# Patient Record
Sex: MALE | Race: WHITE | URBAN - METROPOLITAN AREA
[De-identification: names, ages, dates, MRNs, and addresses within clinical notes are randomized per-mention and may not be internally consistent; named-entity substitution may affect disease eponyms.]

---

## 2017-09-26 ENCOUNTER — OFFICE VISIT (OUTPATIENT)
Dept: FAMILY MEDICINE CLINIC | Age: 32
End: 2017-09-26

## 2017-09-26 VITALS
OXYGEN SATURATION: 98 % | SYSTOLIC BLOOD PRESSURE: 150 MMHG | HEART RATE: 90 BPM | WEIGHT: 277 LBS | DIASTOLIC BLOOD PRESSURE: 98 MMHG | BODY MASS INDEX: 35.55 KG/M2 | HEIGHT: 74 IN

## 2017-09-26 DIAGNOSIS — Z23 NEED FOR TDAP VACCINATION: ICD-10-CM

## 2017-09-26 DIAGNOSIS — K21.9 GASTROESOPHAGEAL REFLUX DISEASE, ESOPHAGITIS PRESENCE NOT SPECIFIED: ICD-10-CM

## 2017-09-26 DIAGNOSIS — I10 ESSENTIAL HYPERTENSION: ICD-10-CM

## 2017-09-26 DIAGNOSIS — K76.0 HEPATIC STEATOSIS: ICD-10-CM

## 2017-09-26 DIAGNOSIS — Z00.00 ANNUAL PHYSICAL EXAM: ICD-10-CM

## 2017-09-26 DIAGNOSIS — E66.9 OBESITY, UNSPECIFIED OBESITY SEVERITY, UNSPECIFIED OBESITY TYPE: ICD-10-CM

## 2017-09-26 LAB
A/G RATIO: 2.3 (ref 1.1–2.2)
ALBUMIN SERPL-MCNC: 4.8 G/DL (ref 3.4–5)
ALP BLD-CCNC: 46 U/L (ref 40–129)
ALT SERPL-CCNC: 49 U/L (ref 10–40)
ANION GAP SERPL CALCULATED.3IONS-SCNC: 13 MMOL/L (ref 3–16)
AST SERPL-CCNC: 33 U/L (ref 15–37)
BASOPHILS ABSOLUTE: 0.1 K/UL (ref 0–0.2)
BASOPHILS RELATIVE PERCENT: 1.5 %
BILIRUB SERPL-MCNC: 0.7 MG/DL (ref 0–1)
BILIRUBIN, POC: NORMAL
BLOOD URINE, POC: NORMAL
BUN BLDV-MCNC: 12 MG/DL (ref 7–20)
CALCIUM SERPL-MCNC: 9.9 MG/DL (ref 8.3–10.6)
CHLORIDE BLD-SCNC: 103 MMOL/L (ref 99–110)
CHOLESTEROL, TOTAL: 156 MG/DL (ref 0–199)
CLARITY, POC: NORMAL
CO2: 27 MMOL/L (ref 21–32)
COLOR, POC: NORMAL
CREAT SERPL-MCNC: 0.9 MG/DL (ref 0.9–1.3)
EOSINOPHILS ABSOLUTE: 0.1 K/UL (ref 0–0.6)
EOSINOPHILS RELATIVE PERCENT: 2.7 %
GFR AFRICAN AMERICAN: >60
GFR NON-AFRICAN AMERICAN: >60
GLOBULIN: 2.1 G/DL
GLUCOSE BLD-MCNC: 96 MG/DL (ref 70–99)
GLUCOSE URINE, POC: NORMAL
HCT VFR BLD CALC: 48.1 % (ref 40.5–52.5)
HDLC SERPL-MCNC: 26 MG/DL (ref 40–60)
HEMOGLOBIN: 16.4 G/DL (ref 13.5–17.5)
KETONES, POC: NORMAL
LDL CHOLESTEROL CALCULATED: 97 MG/DL
LEUKOCYTE EST, POC: NORMAL
LYMPHOCYTES ABSOLUTE: 1.7 K/UL (ref 1–5.1)
LYMPHOCYTES RELATIVE PERCENT: 33.5 %
MCH RBC QN AUTO: 30.3 PG (ref 26–34)
MCHC RBC AUTO-ENTMCNC: 34.1 G/DL (ref 31–36)
MCV RBC AUTO: 88.6 FL (ref 80–100)
MONOCYTES ABSOLUTE: 0.6 K/UL (ref 0–1.3)
MONOCYTES RELATIVE PERCENT: 11 %
NEUTROPHILS ABSOLUTE: 2.6 K/UL (ref 1.7–7.7)
NEUTROPHILS RELATIVE PERCENT: 51.3 %
NITRITE, POC: NORMAL
PDW BLD-RTO: 12.7 % (ref 12.4–15.4)
PH, POC: 7
PLATELET # BLD: 222 K/UL (ref 135–450)
PMV BLD AUTO: 8.7 FL (ref 5–10.5)
POTASSIUM SERPL-SCNC: 4.8 MMOL/L (ref 3.5–5.1)
PROTEIN, POC: NORMAL
RBC # BLD: 5.43 M/UL (ref 4.2–5.9)
SODIUM BLD-SCNC: 143 MMOL/L (ref 136–145)
SPECIFIC GRAVITY, POC: 1.01
TOTAL PROTEIN: 6.9 G/DL (ref 6.4–8.2)
TRIGL SERPL-MCNC: 167 MG/DL (ref 0–150)
TSH SERPL DL<=0.05 MIU/L-ACNC: 1.37 UIU/ML (ref 0.27–4.2)
UROBILINOGEN, POC: 0.2
VLDLC SERPL CALC-MCNC: 33 MG/DL
WBC # BLD: 5.1 K/UL (ref 4–11)

## 2017-09-26 PROCEDURE — 81002 URINALYSIS NONAUTO W/O SCOPE: CPT | Performed by: NURSE PRACTITIONER

## 2017-09-26 PROCEDURE — 99385 PREV VISIT NEW AGE 18-39: CPT | Performed by: NURSE PRACTITIONER

## 2017-09-26 RX ORDER — HYDROCHLOROTHIAZIDE 25 MG/1
25 TABLET ORAL DAILY
Qty: 30 TABLET | Refills: 3 | Status: SHIPPED | OUTPATIENT
Start: 2017-09-26 | End: 2017-10-30

## 2017-09-26 RX ORDER — OMEPRAZOLE 40 MG/1
40 CAPSULE, DELAYED RELEASE ORAL
COMMUNITY
End: 2017-09-26

## 2017-09-26 RX ORDER — OMEPRAZOLE 20 MG/1
20 CAPSULE, DELAYED RELEASE ORAL DAILY
COMMUNITY
End: 2017-09-26

## 2017-09-26 RX ORDER — M-VIT,TX,IRON,MINS/CALC/FOLIC 27MG-0.4MG
1 TABLET ORAL DAILY
COMMUNITY
End: 2020-04-22 | Stop reason: ALTCHOICE

## 2017-09-26 RX ORDER — OMEPRAZOLE 20 MG/1
20 CAPSULE, DELAYED RELEASE ORAL DAILY
Qty: 30 CAPSULE | Refills: 5 | Status: SHIPPED | OUTPATIENT
Start: 2017-09-26 | End: 2018-04-19 | Stop reason: SDUPTHER

## 2017-09-26 ASSESSMENT — PATIENT HEALTH QUESTIONNAIRE - PHQ9
SUM OF ALL RESPONSES TO PHQ QUESTIONS 1-9: 0
2. FEELING DOWN, DEPRESSED OR HOPELESS: 0
1. LITTLE INTEREST OR PLEASURE IN DOING THINGS: 0
SUM OF ALL RESPONSES TO PHQ9 QUESTIONS 1 & 2: 0

## 2017-10-14 ENCOUNTER — TELEPHONE (OUTPATIENT)
Dept: FAMILY MEDICINE CLINIC | Age: 32
End: 2017-10-14

## 2017-10-30 ENCOUNTER — OFFICE VISIT (OUTPATIENT)
Dept: FAMILY MEDICINE CLINIC | Age: 32
End: 2017-10-30

## 2017-10-30 VITALS
SYSTOLIC BLOOD PRESSURE: 138 MMHG | WEIGHT: 272 LBS | OXYGEN SATURATION: 98 % | BODY MASS INDEX: 34.92 KG/M2 | DIASTOLIC BLOOD PRESSURE: 88 MMHG | HEART RATE: 100 BPM

## 2017-10-30 DIAGNOSIS — I10 ESSENTIAL HYPERTENSION: Primary | ICD-10-CM

## 2017-10-30 PROCEDURE — 99213 OFFICE O/P EST LOW 20 MIN: CPT | Performed by: NURSE PRACTITIONER

## 2017-10-30 RX ORDER — CHLORAL HYDRATE 500 MG
3000 CAPSULE ORAL 3 TIMES DAILY
COMMUNITY
End: 2020-04-22 | Stop reason: ALTCHOICE

## 2017-10-30 RX ORDER — LISINOPRIL AND HYDROCHLOROTHIAZIDE 12.5; 1 MG/1; MG/1
1 TABLET ORAL DAILY
Qty: 30 TABLET | Refills: 3 | Status: SHIPPED | OUTPATIENT
Start: 2017-10-30 | End: 2018-01-18

## 2017-10-30 NOTE — PATIENT INSTRUCTIONS
hydrochlorothiazide and lisinopril together with any medication that contains aliskiren ([place], [place], [place], [place]). You may also need to avoid taking hydrochlorothiazide and lisinopril with aliskiren if you have kidney disease. To make sure hydrochlorothiazide and lisinopril is safe for you, tell your doctor if you have:  · kidney disease (or if you are on dialysis);  · liver disease;  · glaucoma;  · heart disease or congestive heart failure;  · asthma or allergies;  · gout;  · lupus;  · diabetes;  · an allergy to sulfa drugs or penicillin; or  · if you have had an organ transplant. Do not use if you are pregnant. If you become pregnant, stop taking this medicine and tell your doctor right away. Lisinopril can cause injury or death to the unborn baby if you take the medicine during your second or third trimester. This medicine can pass into breast milk and may harm a nursing baby. Tell your doctor if you are breast-feeding a baby. How should I take hydrochlorothiazide and lisinopril? Follow all directions on your prescription label. Your doctor may occasionally change your dose to make sure you get the best results. Do not take this medicine in larger or smaller amounts or for longer than recommended. Take each dose with a full glass of water. Call your doctor if you have ongoing vomiting or diarrhea, or if you are sweating more than usual. You can easily become dehydrated while taking this medicine. This can lead to very low blood pressure, electrolyte disorders, or kidney failure. While using hydrochlorothiazide and lisinopril, you may need frequent blood tests at your doctor's office. Your blood pressure will need to be checked often. Keep using this medicine as directed, even if you feel well. High blood pressure often has no symptoms. You may need to use blood pressure medication for the rest of your life.   If you need surgery or medical tests, tell the doctor ahead of time that you are taking medicine that contains hydrochlorothiazide. You may need to stop using the medicine for a short time. Store at room temperature away from moisture, heat, and light. What happens if I miss a dose? Take the missed dose as soon as you remember. Skip the missed dose if it is almost time for your next scheduled dose. Do not take extra medicine to make up the missed dose. What happens if I overdose? Seek emergency medical attention or call the Poison Help line at 1-223.690.6051. What should I avoid while taking hydrochlorothiazide and lisinopril? Drinking alcohol can increase certain side effects of this medicine. Do not use potassium supplements or salt substitutes while you are taking this medicine, unless your doctor has told you to. Avoid getting up too fast from a sitting or lying position, or you may feel dizzy. Get up slowly and steady yourself to prevent a fall. Avoid becoming overheated or dehydrated during exercise, in hot weather, or by not drinking enough fluids. Follow your doctor's instructions about the type and amount of liquids you should drink. In some cases, drinking too much liquid can be as unsafe as not drinking enough. Avoid becoming overheated or dehydrated during exercise, in hot weather, or by not drinking enough fluids. Follow your doctor's instructions about the type and amount of liquids you should drink. In some cases, drinking too much liquid can be as unsafe as not drinking enough. What are the possible side effects of hydrochlorothiazide and lisinopril? Get emergency medical help if you have signs of an allergic reaction: hives; severe stomach pain; difficulty breathing; swelling of your face, lips, tongue, or throat.   Call your doctor at once if you have:  · a light-headed feeling, like you might pass out;  · eye pain, vision problems;  · little or no urinating;  · feeling weak, drowsy, or restless;  · fever, chills, sore throat, mouth sores, trouble swallowing;  · jaundice (yellowing of the skin or eyes);  · high potassium --nausea, slow or unusual heart rate, weakness, loss of movement;  · low potassium --leg cramps, constipation, irregular heartbeats, fluttering in your chest, extreme thirst, increased urination, numbness or tingling, muscle weakness or limp feeling; or  · low levels of sodium in the body --headache, confusion, slurred speech, severe weakness, vomiting, loss of coordination, feeling unsteady. Common side effects may include:  · cough;  · headache;  · dizziness; or  · tired feeling. This is not a complete list of side effects and others may occur. Call your doctor for medical advice about side effects. You may report side effects to FDA at 6-918-FDA-8105. What other drugs will affect hydrochlorothiazide and lisinopril? Other drugs may interact with hydrochlorothiazide and lisinopril, including prescription and over-the-counter medicines, vitamins, and herbal products. Tell each of your health care providers about all medicines you use now and any medicine you start or stop using. Where can I get more information? Your pharmacist can provide more information about hydrochlorothiazide and lisinopril. Remember, keep this and all other medicines out of the reach of children, never share your medicines with others, and use this medication only for the indication prescribed. Every effort has been made to ensure that the information provided by [address] is accurate, up-to-date, and complete, but no guarantee is made to that effect. Drug information contained herein may be time sensitive. [facility] information has been compiled for use by healthcare practitioners and consumers in the [place] and therefore [facility] does not warrant that uses outside of the [place] are appropriate, unless specifically indicated otherwise. [facility]'s drug information does not endorse drugs, diagnose patients or recommend therapy. [facility]'s drug information is an informational resource designed to assist licensed healthcare practitioners in caring for their patients and/or to serve consumers viewing this service as a supplement to, and not a substitute for, the expertise, skill, knowledge and judgment of healthcare practitioners. The absence of a warning for a given drug or drug combination in no way should be construed to indicate that the drug or drug combination is safe, effective or appropriate for any given patient. [facility] does not assume any responsibility for any aspect of healthcare administered with the aid of information [facility] provides. The information contained herein is not intended to cover all possible uses, directions, precautions, warnings, drug interactions, allergic reactions, or adverse effects. If you have questions about the drugs you are taking, check with your doctor, nurse or pharmacist.  Copyright 5855-4291 [address]: 13.01. Revision date: 11/6/2015. Care instructions adapted under license by [facility] ([facility]). If you have questions about a medical condition or this instruction, always ask your healthcare professional. [place] [address] any warranty or liability for your use of this information.

## 2017-11-04 ASSESSMENT — ENCOUNTER SYMPTOMS
NAUSEA: 0
RESPIRATORY NEGATIVE: 1
ABDOMINAL PAIN: 0
BLURRED VISION: 0
VOMITING: 0

## 2017-11-04 NOTE — PROGRESS NOTES
Georgie Chris 27 y.o. male    Chief Complaint   Patient presents with    Hypertension       HPI     HTN follow-up, was seen here 1 month ago for HTN. +hx of HTN in the past, was on HCTZ, loss weight, BP was fine without meds. Now BP readings about 150/90 at work, weight went up, works at [employer], has 7 kids, does not eat healthy. Denies headaches, chest pain, SOB, edema, sleep apnea symptoms, feels well. Last visit HCTZ 25mg was restarted, BP still elevated per pt report. CBC, BMP, TSH, UA ok. Past medical, surgical, family and social history were reviewed and updated with the patient. Current Outpatient Prescriptions:     lisinopril-hydrochlorothiazide (PRINZIDE;ZESTORETIC) 10-12.5 MG per tablet, Take 1 tablet by mouth daily, Disp: 30 tablet, Rfl: 3    Omega-3 Fatty Acids (FISH OIL) 1000 MG CAPS, Take 3,000 mg by mouth 3 times daily, Disp: , Rfl:     Misc Natural Products (GLUCOS-CHONDROIT-MSM COMPLEX PO), Take by mouth, Disp: , Rfl:     Multiple Vitamins-Minerals (THERAPEUTIC MULTIVITAMIN-MINERALS) tablet, Take 1 tablet by mouth daily, Disp: , Rfl:     omeprazole (PRILOSEC) 20 MG delayed release capsule, Take 1 capsule by mouth daily, Disp: 30 capsule, Rfl: 5    Review of Systems   Constitutional: Negative. Eyes: Negative for blurred vision. Respiratory: Negative. Cardiovascular: Negative for chest pain, palpitations, leg swelling and PND. Gastrointestinal: Negative for abdominal pain, nausea and vomiting. Neurological: Negative for dizziness and headaches. Psychiatric/Behavioral: The patient is not nervous/anxious. Physical Exam   Constitutional: He is oriented to person, place, and time. He appears well-developed and well-nourished. No distress. Neck: Neck supple. Cardiovascular: Normal rate, regular rhythm, normal heart sounds and intact distal pulses. No murmur heard.   No edema   Pulmonary/Chest: Effort normal and breath sounds normal.   Lymphadenopathy: He has no cervical adenopathy. Neurological: He is alert and oriented to person, place, and time. Psychiatric: He has a normal mood and affect. His behavior is normal.   Nursing note and vitals reviewed. Vitals:    10/30/17 1439   BP: 138/88   Pulse:    SpO2:        Assessment    1. Essential hypertension        Plan    Bonnie Miranda was seen today for hypertension. Diagnoses and all orders for this visit:    Essential hypertension  -     Add lisinopril, side effects reviewed. -     lisinopril-hydrochlorothiazide (PRINZIDE;ZESTORETIC) 10-12.5 MG per tablet; Take 1 tablet by mouth daily        -     Check BP and email with readings in 2 weeks, OV in 1 month. Healthier diet, weight loss reinforced.

## 2017-12-01 ENCOUNTER — OFFICE VISIT (OUTPATIENT)
Dept: FAMILY MEDICINE CLINIC | Age: 32
End: 2017-12-01

## 2017-12-01 VITALS
BODY MASS INDEX: 35.68 KG/M2 | HEIGHT: 74 IN | HEART RATE: 98 BPM | DIASTOLIC BLOOD PRESSURE: 88 MMHG | SYSTOLIC BLOOD PRESSURE: 128 MMHG | RESPIRATION RATE: 16 BRPM | WEIGHT: 278 LBS | OXYGEN SATURATION: 98 %

## 2017-12-01 DIAGNOSIS — I10 ESSENTIAL HYPERTENSION: ICD-10-CM

## 2017-12-01 DIAGNOSIS — R04.0 EPISTAXIS: ICD-10-CM

## 2017-12-01 LAB
APTT: 31.5 SEC (ref 24.1–34.9)
BASOPHILS ABSOLUTE: 0 K/UL (ref 0–0.2)
BASOPHILS RELATIVE PERCENT: 0.9 %
EOSINOPHILS ABSOLUTE: 0.1 K/UL (ref 0–0.6)
EOSINOPHILS RELATIVE PERCENT: 3 %
HCT VFR BLD CALC: 48.9 % (ref 40.5–52.5)
HEMOGLOBIN: 16.7 G/DL (ref 13.5–17.5)
INR BLD: 0.99 (ref 0.85–1.15)
LYMPHOCYTES ABSOLUTE: 1.8 K/UL (ref 1–5.1)
LYMPHOCYTES RELATIVE PERCENT: 37.2 %
MCH RBC QN AUTO: 30 PG (ref 26–34)
MCHC RBC AUTO-ENTMCNC: 34.2 G/DL (ref 31–36)
MCV RBC AUTO: 87.7 FL (ref 80–100)
MONOCYTES ABSOLUTE: 0.6 K/UL (ref 0–1.3)
MONOCYTES RELATIVE PERCENT: 11.8 %
NEUTROPHILS ABSOLUTE: 2.2 K/UL (ref 1.7–7.7)
NEUTROPHILS RELATIVE PERCENT: 47.1 %
PDW BLD-RTO: 12.6 % (ref 12.4–15.4)
PLATELET # BLD: 225 K/UL (ref 135–450)
PMV BLD AUTO: 8.3 FL (ref 5–10.5)
PROTHROMBIN TIME: 11.2 SEC (ref 9.6–13)
RBC # BLD: 5.58 M/UL (ref 4.2–5.9)
WBC # BLD: 4.7 K/UL (ref 4–11)

## 2017-12-01 PROCEDURE — 99213 OFFICE O/P EST LOW 20 MIN: CPT | Performed by: NURSE PRACTITIONER

## 2017-12-01 ASSESSMENT — ENCOUNTER SYMPTOMS
BLURRED VISION: 0
SHORTNESS OF BREATH: 0

## 2017-12-01 NOTE — PROGRESS NOTES
person, place, and time. He appears well-developed. No distress. HENT:   Nose: Rhinorrhea present. No sinus tenderness or nasal septal hematoma. No epistaxis. Right side nare- small scab noted   Neck: Neck supple. No thyromegaly present. Cardiovascular: Normal rate, regular rhythm, normal heart sounds and intact distal pulses. No murmur heard. Pulmonary/Chest: Effort normal and breath sounds normal.   Neurological: He is alert and oriented to person, place, and time. Nursing note and vitals reviewed. Vitals:    12/01/17 0949   BP: 128/88   Site: Left Arm   Position: Sitting   Cuff Size: Large Adult   Pulse: 98   Resp: 16   SpO2: 98%   Weight: 278 lb (126.1 kg)   Height: 6' 2\" (1.88 m)     Assessment    1. Essential hypertension    2. Epistaxis        Plan    Maynor Blevins was seen today for hypertension and epistaxis.     Diagnoses and all orders for this visit:    Essential hypertension        -     Controlled, continue current   Epistaxis- 3 episodes during 1 month  -     CBC WITH AUTO DIFFERENTIAL  -     PROTIME-INR  -     APTT  -     See ENT if reoccurs- Floyce Saint, MD

## 2018-01-18 ENCOUNTER — TELEPHONE (OUTPATIENT)
Dept: FAMILY MEDICINE CLINIC | Age: 33
End: 2018-01-18

## 2018-01-18 DIAGNOSIS — I10 ESSENTIAL HYPERTENSION: Primary | ICD-10-CM

## 2018-01-18 RX ORDER — VALSARTAN AND HYDROCHLOROTHIAZIDE 80; 12.5 MG/1; MG/1
1 TABLET, FILM COATED ORAL DAILY
Qty: 30 TABLET | Refills: 5 | Status: SHIPPED | OUTPATIENT
Start: 2018-01-18 | End: 2018-06-05 | Stop reason: SDUPTHER

## 2018-01-18 NOTE — TELEPHONE ENCOUNTER
2 months into taking Lisinopril and he is still coughing. Stated he spoke to you about this on 12-1-201 and you told him to call you if his coughing continued. Requesting a change in meds.

## 2018-06-05 ENCOUNTER — OFFICE VISIT (OUTPATIENT)
Dept: FAMILY MEDICINE CLINIC | Age: 33
End: 2018-06-05

## 2018-06-05 VITALS
WEIGHT: 280 LBS | DIASTOLIC BLOOD PRESSURE: 82 MMHG | HEART RATE: 77 BPM | OXYGEN SATURATION: 98 % | BODY MASS INDEX: 35.94 KG/M2 | HEIGHT: 74 IN | SYSTOLIC BLOOD PRESSURE: 126 MMHG

## 2018-06-05 DIAGNOSIS — Z23 NEED FOR TDAP VACCINATION: ICD-10-CM

## 2018-06-05 DIAGNOSIS — I10 ESSENTIAL HYPERTENSION: ICD-10-CM

## 2018-06-05 PROCEDURE — 99212 OFFICE O/P EST SF 10 MIN: CPT | Performed by: NURSE PRACTITIONER

## 2018-06-05 PROCEDURE — 90471 IMMUNIZATION ADMIN: CPT | Performed by: NURSE PRACTITIONER

## 2018-06-05 PROCEDURE — 90715 TDAP VACCINE 7 YRS/> IM: CPT | Performed by: NURSE PRACTITIONER

## 2018-06-05 RX ORDER — VALSARTAN AND HYDROCHLOROTHIAZIDE 80; 12.5 MG/1; MG/1
1 TABLET, FILM COATED ORAL DAILY
Qty: 30 TABLET | Refills: 5 | Status: SHIPPED | OUTPATIENT
Start: 2018-06-05 | End: 2018-10-22 | Stop reason: DRUGHIGH

## 2018-06-05 ASSESSMENT — ENCOUNTER SYMPTOMS: RESPIRATORY NEGATIVE: 1

## 2018-07-24 DIAGNOSIS — I10 ESSENTIAL HYPERTENSION: ICD-10-CM

## 2018-07-24 RX ORDER — LOSARTAN POTASSIUM AND HYDROCHLOROTHIAZIDE 12.5; 5 MG/1; MG/1
1 TABLET ORAL DAILY
Qty: 30 TABLET | Refills: 3 | Status: SHIPPED | OUTPATIENT
Start: 2018-07-24 | End: 2018-10-22 | Stop reason: SDUPTHER

## 2018-07-24 RX ORDER — VALSARTAN AND HYDROCHLOROTHIAZIDE 80; 12.5 MG/1; MG/1
1 TABLET, FILM COATED ORAL DAILY
Qty: 30 TABLET | OUTPATIENT
Start: 2018-07-24

## 2018-10-22 ENCOUNTER — OFFICE VISIT (OUTPATIENT)
Dept: FAMILY MEDICINE CLINIC | Age: 33
End: 2018-10-22
Payer: COMMERCIAL

## 2018-10-22 VITALS
DIASTOLIC BLOOD PRESSURE: 82 MMHG | OXYGEN SATURATION: 98 % | HEIGHT: 74 IN | RESPIRATION RATE: 16 BRPM | HEART RATE: 76 BPM | SYSTOLIC BLOOD PRESSURE: 140 MMHG | BODY MASS INDEX: 32.73 KG/M2 | WEIGHT: 255 LBS

## 2018-10-22 DIAGNOSIS — R20.9 SKIN SENSATION DISTURBANCE: ICD-10-CM

## 2018-10-22 DIAGNOSIS — Z00.00 PHYSICAL EXAM: ICD-10-CM

## 2018-10-22 DIAGNOSIS — B07.9 VIRAL WARTS, UNSPECIFIED TYPE: ICD-10-CM

## 2018-10-22 DIAGNOSIS — I10 ESSENTIAL HYPERTENSION: ICD-10-CM

## 2018-10-22 LAB
A/G RATIO: 2.5 (ref 1.1–2.2)
ALBUMIN SERPL-MCNC: 4.8 G/DL (ref 3.4–5)
ALP BLD-CCNC: 48 U/L (ref 40–129)
ALT SERPL-CCNC: 21 U/L (ref 10–40)
ANION GAP SERPL CALCULATED.3IONS-SCNC: 12 MMOL/L (ref 3–16)
AST SERPL-CCNC: 18 U/L (ref 15–37)
BASOPHILS ABSOLUTE: 0 K/UL (ref 0–0.2)
BASOPHILS RELATIVE PERCENT: 0.9 %
BILIRUB SERPL-MCNC: 0.6 MG/DL (ref 0–1)
BUN BLDV-MCNC: 13 MG/DL (ref 7–20)
CALCIUM SERPL-MCNC: 9.7 MG/DL (ref 8.3–10.6)
CHLORIDE BLD-SCNC: 105 MMOL/L (ref 99–110)
CHOLESTEROL, TOTAL: 137 MG/DL (ref 0–199)
CO2: 26 MMOL/L (ref 21–32)
CREAT SERPL-MCNC: 0.9 MG/DL (ref 0.9–1.3)
EOSINOPHILS ABSOLUTE: 0.1 K/UL (ref 0–0.6)
EOSINOPHILS RELATIVE PERCENT: 3.8 %
GFR AFRICAN AMERICAN: >60
GFR NON-AFRICAN AMERICAN: >60
GLOBULIN: 1.9 G/DL
GLUCOSE BLD-MCNC: 94 MG/DL (ref 70–99)
HCT VFR BLD CALC: 47 % (ref 40.5–52.5)
HDLC SERPL-MCNC: 29 MG/DL (ref 40–60)
HEMOGLOBIN: 15.5 G/DL (ref 13.5–17.5)
LDL CHOLESTEROL CALCULATED: 92 MG/DL
LYMPHOCYTES ABSOLUTE: 1.1 K/UL (ref 1–5.1)
LYMPHOCYTES RELATIVE PERCENT: 30.2 %
MCH RBC QN AUTO: 29.4 PG (ref 26–34)
MCHC RBC AUTO-ENTMCNC: 33.1 G/DL (ref 31–36)
MCV RBC AUTO: 89 FL (ref 80–100)
MONOCYTES ABSOLUTE: 0.4 K/UL (ref 0–1.3)
MONOCYTES RELATIVE PERCENT: 10.3 %
NEUTROPHILS ABSOLUTE: 2 K/UL (ref 1.7–7.7)
NEUTROPHILS RELATIVE PERCENT: 54.8 %
PDW BLD-RTO: 13.1 % (ref 12.4–15.4)
PLATELET # BLD: 226 K/UL (ref 135–450)
PMV BLD AUTO: 8.5 FL (ref 5–10.5)
POTASSIUM SERPL-SCNC: 4.7 MMOL/L (ref 3.5–5.1)
RBC # BLD: 5.28 M/UL (ref 4.2–5.9)
SODIUM BLD-SCNC: 143 MMOL/L (ref 136–145)
TOTAL PROTEIN: 6.7 G/DL (ref 6.4–8.2)
TRIGL SERPL-MCNC: 81 MG/DL (ref 0–150)
VITAMIN D 25-HYDROXY: 40.3 NG/ML
VLDLC SERPL CALC-MCNC: 16 MG/DL
WBC # BLD: 3.7 K/UL (ref 4–11)

## 2018-10-22 PROCEDURE — 99395 PREV VISIT EST AGE 18-39: CPT | Performed by: NURSE PRACTITIONER

## 2018-10-22 PROCEDURE — 17110 DESTRUCTION B9 LES UP TO 14: CPT | Performed by: NURSE PRACTITIONER

## 2018-10-22 RX ORDER — LOSARTAN POTASSIUM AND HYDROCHLOROTHIAZIDE 12.5; 5 MG/1; MG/1
1 TABLET ORAL DAILY
Qty: 30 TABLET | Refills: 5 | Status: SHIPPED | OUTPATIENT
Start: 2018-10-22 | End: 2019-02-15 | Stop reason: SDUPTHER

## 2018-10-22 ASSESSMENT — PATIENT HEALTH QUESTIONNAIRE - PHQ9
1. LITTLE INTEREST OR PLEASURE IN DOING THINGS: 0
2. FEELING DOWN, DEPRESSED OR HOPELESS: 0
SUM OF ALL RESPONSES TO PHQ9 QUESTIONS 1 & 2: 0
SUM OF ALL RESPONSES TO PHQ QUESTIONS 1-9: 0
SUM OF ALL RESPONSES TO PHQ QUESTIONS 1-9: 0

## 2018-10-25 DIAGNOSIS — D72.819 LEUKOPENIA, UNSPECIFIED TYPE: Primary | ICD-10-CM

## 2018-11-06 ENCOUNTER — PROCEDURE VISIT (OUTPATIENT)
Dept: FAMILY MEDICINE CLINIC | Age: 33
End: 2018-11-06
Payer: COMMERCIAL

## 2018-11-06 VITALS
SYSTOLIC BLOOD PRESSURE: 136 MMHG | HEART RATE: 93 BPM | DIASTOLIC BLOOD PRESSURE: 80 MMHG | BODY MASS INDEX: 33.62 KG/M2 | HEIGHT: 74 IN | WEIGHT: 262 LBS | OXYGEN SATURATION: 98 %

## 2018-11-06 DIAGNOSIS — B07.9 VIRAL WARTS, UNSPECIFIED TYPE: ICD-10-CM

## 2018-11-06 DIAGNOSIS — R20.9 DISTURBANCE OF SKIN SENSATION: ICD-10-CM

## 2018-11-06 PROCEDURE — 99212 OFFICE O/P EST SF 10 MIN: CPT | Performed by: NURSE PRACTITIONER

## 2018-11-06 PROCEDURE — 17110 DESTRUCTION B9 LES UP TO 14: CPT | Performed by: NURSE PRACTITIONER

## 2019-02-15 DIAGNOSIS — F17.200 TOBACCO DEPENDENCE: Primary | ICD-10-CM

## 2019-02-15 DIAGNOSIS — I10 ESSENTIAL HYPERTENSION: ICD-10-CM

## 2019-02-15 RX ORDER — OMEPRAZOLE 20 MG/1
20 CAPSULE, DELAYED RELEASE ORAL DAILY
Qty: 90 CAPSULE | Refills: 1 | Status: SHIPPED | OUTPATIENT
Start: 2019-02-15 | End: 2019-07-13 | Stop reason: SDUPTHER

## 2019-02-15 RX ORDER — VARENICLINE TARTRATE 25 MG
KIT ORAL
Qty: 53 TABLET | Refills: 0 | Status: SHIPPED | OUTPATIENT
Start: 2019-02-15 | End: 2019-10-01

## 2019-02-15 RX ORDER — LOSARTAN POTASSIUM AND HYDROCHLOROTHIAZIDE 12.5; 5 MG/1; MG/1
1 TABLET ORAL DAILY
Qty: 90 TABLET | Refills: 1 | Status: SHIPPED | OUTPATIENT
Start: 2019-02-15 | End: 2019-07-13 | Stop reason: SDUPTHER

## 2019-07-13 DIAGNOSIS — I10 ESSENTIAL HYPERTENSION: ICD-10-CM

## 2019-07-15 RX ORDER — LOSARTAN POTASSIUM AND HYDROCHLOROTHIAZIDE 12.5; 5 MG/1; MG/1
1 TABLET ORAL DAILY
Qty: 90 TABLET | Refills: 1 | Status: SHIPPED | OUTPATIENT
Start: 2019-07-15 | End: 2019-10-01

## 2019-07-15 RX ORDER — OMEPRAZOLE 20 MG/1
20 CAPSULE, DELAYED RELEASE ORAL DAILY
Qty: 90 CAPSULE | Refills: 1 | Status: SHIPPED | OUTPATIENT
Start: 2019-07-15 | End: 2019-12-02 | Stop reason: SDUPTHER

## 2019-10-01 ENCOUNTER — OFFICE VISIT (OUTPATIENT)
Dept: FAMILY MEDICINE CLINIC | Age: 34
End: 2019-10-01
Payer: COMMERCIAL

## 2019-10-01 VITALS
SYSTOLIC BLOOD PRESSURE: 148 MMHG | HEIGHT: 74 IN | WEIGHT: 270 LBS | RESPIRATION RATE: 16 BRPM | BODY MASS INDEX: 34.65 KG/M2 | HEART RATE: 84 BPM | OXYGEN SATURATION: 98 % | DIASTOLIC BLOOD PRESSURE: 92 MMHG

## 2019-10-01 DIAGNOSIS — I10 ESSENTIAL HYPERTENSION: ICD-10-CM

## 2019-10-01 DIAGNOSIS — R29.818 SUSPECTED SLEEP APNEA: ICD-10-CM

## 2019-10-01 DIAGNOSIS — Z00.00 PHYSICAL EXAM: ICD-10-CM

## 2019-10-01 DIAGNOSIS — E66.9 CLASS 1 OBESITY WITH SERIOUS COMORBIDITY AND BODY MASS INDEX (BMI) OF 34.0 TO 34.9 IN ADULT, UNSPECIFIED OBESITY TYPE: ICD-10-CM

## 2019-10-01 LAB
A/G RATIO: 2.7 (ref 1.1–2.2)
ALBUMIN SERPL-MCNC: 4.9 G/DL (ref 3.4–5)
ALP BLD-CCNC: 49 U/L (ref 40–129)
ALT SERPL-CCNC: 41 U/L (ref 10–40)
ANION GAP SERPL CALCULATED.3IONS-SCNC: 16 MMOL/L (ref 3–16)
AST SERPL-CCNC: 29 U/L (ref 15–37)
BASOPHILS ABSOLUTE: 0 K/UL (ref 0–0.2)
BASOPHILS RELATIVE PERCENT: 0.9 %
BILIRUB SERPL-MCNC: 0.5 MG/DL (ref 0–1)
BUN BLDV-MCNC: 13 MG/DL (ref 7–20)
CALCIUM SERPL-MCNC: 9.6 MG/DL (ref 8.3–10.6)
CHLORIDE BLD-SCNC: 103 MMOL/L (ref 99–110)
CO2: 23 MMOL/L (ref 21–32)
CREAT SERPL-MCNC: 0.9 MG/DL (ref 0.9–1.3)
EOSINOPHILS ABSOLUTE: 0.1 K/UL (ref 0–0.6)
EOSINOPHILS RELATIVE PERCENT: 2.7 %
GFR AFRICAN AMERICAN: >60
GFR NON-AFRICAN AMERICAN: >60
GLOBULIN: 1.8 G/DL
GLUCOSE BLD-MCNC: 101 MG/DL (ref 70–99)
HCT VFR BLD CALC: 45.8 % (ref 40.5–52.5)
HEMOGLOBIN: 15.7 G/DL (ref 13.5–17.5)
LYMPHOCYTES ABSOLUTE: 1.3 K/UL (ref 1–5.1)
LYMPHOCYTES RELATIVE PERCENT: 31.6 %
MCH RBC QN AUTO: 30.3 PG (ref 26–34)
MCHC RBC AUTO-ENTMCNC: 34.3 G/DL (ref 31–36)
MCV RBC AUTO: 88.4 FL (ref 80–100)
MONOCYTES ABSOLUTE: 0.5 K/UL (ref 0–1.3)
MONOCYTES RELATIVE PERCENT: 11.3 %
NEUTROPHILS ABSOLUTE: 2.2 K/UL (ref 1.7–7.7)
NEUTROPHILS RELATIVE PERCENT: 53.5 %
PDW BLD-RTO: 12.7 % (ref 12.4–15.4)
PLATELET # BLD: 211 K/UL (ref 135–450)
PMV BLD AUTO: 8.1 FL (ref 5–10.5)
POTASSIUM SERPL-SCNC: 4.4 MMOL/L (ref 3.5–5.1)
RBC # BLD: 5.18 M/UL (ref 4.2–5.9)
SODIUM BLD-SCNC: 142 MMOL/L (ref 136–145)
TOTAL PROTEIN: 6.7 G/DL (ref 6.4–8.2)
TSH SERPL DL<=0.05 MIU/L-ACNC: 1.38 UIU/ML (ref 0.27–4.2)
WBC # BLD: 4 K/UL (ref 4–11)

## 2019-10-01 PROCEDURE — 99395 PREV VISIT EST AGE 18-39: CPT | Performed by: NURSE PRACTITIONER

## 2019-10-01 RX ORDER — LOSARTAN POTASSIUM 100 MG/1
100 TABLET ORAL DAILY
Qty: 90 TABLET | Refills: 0 | Status: SHIPPED | OUTPATIENT
Start: 2019-10-01 | End: 2020-04-22 | Stop reason: SDUPTHER

## 2019-10-01 ASSESSMENT — PATIENT HEALTH QUESTIONNAIRE - PHQ9
2. FEELING DOWN, DEPRESSED OR HOPELESS: 0
SUM OF ALL RESPONSES TO PHQ QUESTIONS 1-9: 0
SUM OF ALL RESPONSES TO PHQ9 QUESTIONS 1 & 2: 0
SUM OF ALL RESPONSES TO PHQ QUESTIONS 1-9: 0
1. LITTLE INTEREST OR PLEASURE IN DOING THINGS: 0

## 2019-10-02 DIAGNOSIS — Z00.00 PHYSICAL EXAM: ICD-10-CM

## 2019-10-02 LAB
CHOLESTEROL, TOTAL: 156 MG/DL (ref 0–199)
ESTIMATED AVERAGE GLUCOSE: 102.5 MG/DL
HBA1C MFR BLD: 5.2 %
HDLC SERPL-MCNC: 34 MG/DL (ref 40–60)
LDL CHOLESTEROL CALCULATED: 99 MG/DL
TRIGL SERPL-MCNC: 114 MG/DL (ref 0–150)
VLDLC SERPL CALC-MCNC: 23 MG/DL

## 2020-04-21 ENCOUNTER — TELEPHONE (OUTPATIENT)
Dept: FAMILY MEDICINE CLINIC | Age: 35
End: 2020-04-21

## 2020-04-22 ENCOUNTER — TELEMEDICINE (OUTPATIENT)
Dept: FAMILY MEDICINE CLINIC | Age: 35
End: 2020-04-22
Payer: COMMERCIAL

## 2020-04-22 ENCOUNTER — TELEPHONE (OUTPATIENT)
Dept: FAMILY MEDICINE CLINIC | Age: 35
End: 2020-04-22

## 2020-04-22 PROCEDURE — 99214 OFFICE O/P EST MOD 30 MIN: CPT | Performed by: PHYSICIAN ASSISTANT

## 2020-04-22 RX ORDER — OMEPRAZOLE 20 MG/1
20 CAPSULE, DELAYED RELEASE ORAL DAILY
Qty: 90 CAPSULE | Refills: 1 | Status: SHIPPED | OUTPATIENT
Start: 2020-04-22 | End: 2020-11-11 | Stop reason: SDUPTHER

## 2020-04-22 RX ORDER — LOSARTAN POTASSIUM 100 MG/1
100 TABLET ORAL DAILY
Qty: 90 TABLET | Refills: 1 | Status: SHIPPED | OUTPATIENT
Start: 2020-04-22 | End: 2020-11-04

## 2020-04-22 ASSESSMENT — ENCOUNTER SYMPTOMS
NAUSEA: 0
BLOOD IN STOOL: 0
COLOR CHANGE: 0
ABDOMINAL PAIN: 0
VOMITING: 0
SHORTNESS OF BREATH: 0
CHEST TIGHTNESS: 0
WHEEZING: 0
DIARRHEA: 0
ABDOMINAL DISTENTION: 0

## 2020-04-22 NOTE — PROGRESS NOTES
nausea and vomiting. Endocrine: Negative for cold intolerance and heat intolerance. Skin: Negative for color change. Hematological: Negative for adenopathy. Does not bruise/bleed easily. Prior to Visit Medications    Medication Sig Taking?  Authorizing Provider   omeprazole (PRILOSEC) 20 MG delayed release capsule Take 1 capsule by mouth daily Yes ALFREDO Albright   losartan (COZAAR) 100 MG tablet Take 1 tablet by mouth daily Yes ALFREDO Albright       Social History     Tobacco Use    Smoking status: Former Smoker     Years: 5.00     Last attempt to quit: 2010     Years since quittin.9    Smokeless tobacco: Current User     Types: Chew   Substance Use Topics    Alcohol use: No     Comment: rare    Drug use: No        Allergies   Allergen Reactions    Lisinopril      cough   ,   Past Medical History:   Diagnosis Date    GERD (gastroesophageal reflux disease)     Headache     Hepatic steatosis 2017    Hypertension     off meds x 2 yrs    Lumbar herniated disc     Reflux    ,   Past Surgical History:   Procedure Laterality Date    KNEE SURGERY  2005    left torn meniscus    OTHER SURGICAL HISTORY Left 13     MICROSCOPIC LUMBAR LAMINOTOMY L4-L5 WITH EXCISION OF SYNOVIAL   ,   Social History     Tobacco Use    Smoking status: Former Smoker     Years: 5.00     Last attempt to quit: 2010     Years since quittin.9    Smokeless tobacco: Current User     Types: Chew   Substance Use Topics    Alcohol use: No     Comment: rare    Drug use: No       PHYSICAL EXAMINATION:  [ INSTRUCTIONS:  \"[x]\" Indicates a positive item  \"[]\" Indicates a negative item  -- DELETE ALL ITEMS NOT EXAMINED]  Vital Signs: (As obtained by patient/caregiver or practitioner observation)    Blood pressure- 132/85 Heart rate-    Respiratory rate- 14   Temperature-  Pulse oximetry-     Constitutional: [x] Appears well-developed and well-nourished [x] No apparent distress      [] Abnormal- Mental status  [x] Alert and awake  [x] Oriented to person/place/time [x]Able to follow commands      Eyes:  EOM    [x]  Normal  [] Abnormal-  Sclera  [x]  Normal  [] Abnormal -         Discharge []  None visible  [] Abnormal -    HENT:   [x] Normocephalic, atraumatic. [] Abnormal   [x] Mouth/Throat: Mucous membranes are moist.     External Ears [] Normal  [] Abnormal-     Neck: [] No visualized mass     Pulmonary/Chest: [x] Respiratory effort normal.  [x] No visualized signs of difficulty breathing or respiratory distress        [] Abnormal-      Musculoskeletal:   [] Normal gait with no signs of ataxia         [] Normal range of motion of neck        [] Abnormal-       Neurological:        [] No Facial Asymmetry (Cranial nerve 7 motor function) (limited exam to video visit)          [] No gaze palsy        [] Abnormal-         Skin:        [x] No significant exanthematous lesions or discoloration noted on facial skin         [] Abnormal-            Psychiatric:       [] Normal Affect [] No Hallucinations        [] Abnormal-     Other pertinent observable physical exam findings-     ASSESSMENT/PLAN:  1. Essential hypertension  - Well controlled, continue with current. - losartan (COZAAR) 100 MG tablet; Take 1 tablet by mouth daily  Dispense: 90 tablet; Refill: 1    2. Gastroesophageal reflux disease, esophagitis presence not specified  - Continue taking every other day or every day for GERD. - omeprazole (PRILOSEC) 20 MG delayed release capsule; Take 1 capsule by mouth daily  Dispense: 90 capsule; Refill: 1    3. Positive hepatitis test  -  Retest, with patient's permission will add on hep b and hep c antibodies  - HEPATITIS C ANTIBODY; Future  - HEPATITIS B SURFACE ANTIGEN; Future  - HEPATITIS B SURFACE ANTIBODY; Future  - HEPATITIS B CORE ANTIBODY, TOTAL; Future  - HEPATITIS A ANTIBODY, IGM; Future    4. Abnormal thyroid blood test  -  Retest  - TSH without Reflex; Future  - T4, FREE;  Future  - T3; Future    5. High serum phosphorus for age  -  Retest  - PHOSPHORUS; Future      No follow-ups on file. Burak Kiran is a 38 y.o. male being evaluated by a Virtual Visit (video visit) encounter to address concerns as mentioned above. A caregiver was present when appropriate. Due to this being a TeleHealth encounter (During [place]-02 public health emergency), evaluation of the following organ systems was limited: Vitals/Constitutional/EENT/Resp/CV/GI//MS/Neuro/Skin/Heme-Lymph-Imm. Pursuant to the emergency declaration under the [address] [place] [address], [address] [place] [address] authority and the [employer] Act, this Virtual Visit was conducted with patient's (and/or legal guardian's) consent, to reduce the patient's risk of exposure to COVID-19 and provide necessary medical care. The patient (and/or legal guardian) has also been advised to contact this office for worsening conditions or problems, and seek emergency medical treatment and/or call 911 if deemed necessary. Services were provided through a video synchronous discussion virtually to substitute for in-person clinic visit. Patient and provider were located at their individual homes. --ALFREDO Barber on 4/22/2020 at 3:01 PM    An electronic signature was used to authenticate this note.

## 2020-09-04 DIAGNOSIS — R76.8 POSITIVE HEPATITIS TEST: ICD-10-CM

## 2020-09-04 DIAGNOSIS — R79.89 HIGH SERUM PHOSPHORUS FOR AGE: ICD-10-CM

## 2020-09-04 DIAGNOSIS — R79.89 ABNORMAL THYROID BLOOD TEST: ICD-10-CM

## 2020-09-04 LAB
HAV IGM SER IA-ACNC: NORMAL
HBV SURFACE AB TITR SER: >1000 MIU/ML
HEPATITIS B SURFACE ANTIGEN INTERPRETATION: NORMAL
HEPATITIS C ANTIBODY INTERPRETATION: NORMAL
PHOSPHORUS: 3.3 MG/DL (ref 2.5–4.9)
TSH SERPL DL<=0.05 MIU/L-ACNC: 1.71 UIU/ML (ref 0.27–4.2)

## 2020-09-05 LAB
T3 TOTAL: 1.25 NG/ML (ref 0.8–2)
T4 FREE: 1.2 NG/DL (ref 0.9–1.8)

## 2020-09-06 LAB — HEPATITIS B CORE TOTAL ANTIBODY: NEGATIVE

## 2020-11-02 ENCOUNTER — TELEPHONE (OUTPATIENT)
Dept: PULMONOLOGY | Age: 35
End: 2020-11-02

## 2020-11-02 NOTE — TELEPHONE ENCOUNTER
Within this Telehealth Consent, the terms you and yours refer to the person using the Telehealth Service (Service), or in the case of a use of the Service by or on behalf of a minor, you and yours refer to and include (i) the parent or legal guardian who provides consent to the use of the Service by such minor or uses the Service on behalf of such minor, and (ii) the minor for whom consent is being provided or on whose behalf the Service is being utilized. When using Service, you will be consulting with your health care providers via the use of Telehealth.   Telehealth involves the delivery of healthcare services using electronic communications, information technology or other means between a healthcare provider and a patient who are not in the same physical location. Telehealth may be used for diagnosis, treatment, follow-up and/or patient education, and may include, but is not limited to, one or more of the following:    Electronic transmission of medical records, photo images, personal health information or other data between a patient and a healthcare provider    Interactions between a patient and healthcare provider via audio, video and/or data communications    Use of output data from medical devices, sound and video files    Anticipated Benefits   The use of Telehealth by your Provider(s) through the Service may have the following possible benefits:    Making it easier and more efficient for you to access medical care and treatment for the conditions treated by such Provider(s) utilizing the Service    Allowing you to obtain medical care and treatment by Provider(s) at times that are convenient for you    Enabling you to interact with Provider(s) without the necessity of an in-office appointment     Possible Risks   While the use of Telehealth can provide potential benefits for you, there are also potential risks associated with the use of Telehealth.  These risks include, but may not be the provision of medical care and treatment via Telehealth and the Service and you may not be able to receive diagnosis and/or treatment through the Service for every condition for which you seek diagnosis and/or treatment. 11. There are potential risks to the use of Telehealth, including but not limited to the risks described in this Telehealth Consent. 12. Your Provider(s) have discussed the use of Telehealth and the Service with you, including the benefits and risks of such and you have provided oral consent to your Provider(s) for the use of Telehealth and the Service. 15. You understand that it is your duty to provide your Provider(s) truthful, accurate and complete information, including all relevant information regarding care that you may have received or may be receiving from other healthcare providers outside of the Service. 14. You understand that each of your Provider(s) may determine in his or sole discretion that your condition is not suitable for diagnosis and/or treatment using the Service, and that you may need to seek medical care and treatment a specialist or other healthcare provider, outside of the Service. 15. You understand that you are fully responsible for payment for all services provided by Provider(s) or through use of the Service and that you may not be able to use third-party insurance. 16. You represent that (a) you have read this Telehealth Consent carefully, (b) you understand the risks and benefits of the Service and the use of Telehealth in the medical care and treatment provided to you by Provider(s) using the Service, and (c) you have the legal capacity and authority to provide this consent for yourself and/or the minor for which you are consenting under applicable federal and state laws, including laws relating to the age of [de-identified] and/or parental/guardian consent.    17. You give your informed consent to the use of Telehealth by Provider(s) using the Service under the terms described in the Terms of Service and this Telehealth Consent. The patient was read the following statement and has consented to the visit as of 11/2/20. The patient has been scheduled for their first telehealth visit on 11/4/20 with Dr. Nato Rainey.

## 2020-11-04 ENCOUNTER — VIRTUAL VISIT (OUTPATIENT)
Dept: PULMONOLOGY | Age: 35
End: 2020-11-04
Payer: COMMERCIAL

## 2020-11-04 PROCEDURE — 99244 OFF/OP CNSLTJ NEW/EST MOD 40: CPT | Performed by: INTERNAL MEDICINE

## 2020-11-04 ASSESSMENT — SLEEP AND FATIGUE QUESTIONNAIRES
HOW LIKELY ARE YOU TO NOD OFF OR FALL ASLEEP WHILE SITTING AND READING: 3
ESS TOTAL SCORE: 8
HOW LIKELY ARE YOU TO NOD OFF OR FALL ASLEEP WHILE SITTING INACTIVE IN A PUBLIC PLACE: 0
HOW LIKELY ARE YOU TO NOD OFF OR FALL ASLEEP WHILE LYING DOWN TO REST IN THE AFTERNOON WHEN CIRCUMSTANCES PERMIT: 2
HOW LIKELY ARE YOU TO NOD OFF OR FALL ASLEEP WHILE SITTING QUIETLY AFTER LUNCH WITHOUT ALCOHOL: 1
HOW LIKELY ARE YOU TO NOD OFF OR FALL ASLEEP IN A CAR, WHILE STOPPED FOR A FEW MINUTES IN TRAFFIC: 0
HOW LIKELY ARE YOU TO NOD OFF OR FALL ASLEEP WHILE WATCHING TV: 1
HOW LIKELY ARE YOU TO NOD OFF OR FALL ASLEEP WHEN YOU ARE A PASSENGER IN A CAR FOR AN HOUR WITHOUT A BREAK: 1
HOW LIKELY ARE YOU TO NOD OFF OR FALL ASLEEP WHILE SITTING AND TALKING TO SOMEONE: 0

## 2020-11-04 NOTE — PROGRESS NOTES
MA Communication: The following orders are received by verbal communication from Ron Vásquez MD    Orders include: HST/ sleep lab to call      VV FU Pt.  Will call due       To his work schedule

## 2020-11-04 NOTE — PROGRESS NOTES
REASON FOR CONSULTATION/CC: GARTH, snoring      Consult at request of ALFREDO Méndez  [id number]  [facility], [address] [facility] [address]  for GARHT, snoring  PCP: ALFREDO Méndez    HISTORY OF PRESENT ILLNESS: Charlene Elizondo is a 38y.o. year old male with a history of obesity, essential hypertension, GERD, hepatic steatosis, lumbar DJD who presents valuation and management of possible GARTH. This was a virtual visit through [employer]. me, history was partially obtained from the patient's wife, Dominique Burk. Juan Pablo Cramer is a pleasant 77-year-old male who is , lives with his wife in [place]. He has 4 daughters and 3 sons, all healthy. He works as a . He has smoked in the past.  He was in the [employer] for 5 years, was posted overseas in [facility] and [facility]. 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 He goes to bed around 11 PM, wakes up around 6 AM.  He works for 4 days, then has 2 days off, then 1 day of work. He then goes to the same cycle again. There are times when he is has been awake for 24 hours. He usually does not have any difficulty falling asleep, he turns the TV off before he falls asleep. He is often reading on his tablet. He occasionally wakes up to go to the bathroom, has no difficulty falling asleep on returning. He does not drink any coffee, does drink a Monster drink in the morning. He does not take any naps, but is sleepy in the afternoon. His wife has noted very loud snoring and occasional apneic episodes, choking. He does have a history of bruxism. He is a mouth breather. He does have reflux every other day or so. He has done long distance driving, is not very sleepy at the wheel. He keeps his bedroom dark and cool, temperature between 64 and 65 °F.  He does have 2 cats that do get into the bed, but they do not appear to disturb his sleep. He had gained weight up to 300 pounds, now fluctuates between 260 and 265 pounds.   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 There was no other significant past medical or surgical history, his medications and allergies were reviewed. The rest of his ROS was unremarkable. \"x\" indicates this is positive or the patient agrees.    [x] Loud Snoring [] Nighmares   [] Frequent awakenings at night [x] Teeth grinding during sleep   [] Choking for breath at night [] Morning headaches   [] Gasping during sleep [x] Morning dry mouth   [x] I've been told that I stop breathing when asleep [] Sleep walking   [x] Restless sleep [] Sleep terrors   [] Awaken un-refreshed [] Tongue biting during sleep   [] Waking up to urinate [] Bed wetting   [] Crawling feelings in legs when trying to sleep [] Acting out dreams   [] Leg kicking during sleep [] Feeling paralyzed when falling asleep or waking up    [] Leg cramps during sleep [] Dream-like images when falling asleep   [] Trouble falling asleep at night [] Sudden weakness when laughing   [] Trouble staying asleep at night [] Uncontrollable daytime sleep attacks   [] Racing thoughts when trying to sleep [] Falling asleep unexpectedly   [] Increased muscle tension when trying to sleep [] Falling asleep at work   [] Fear of being unable to sleep [] Falling asleep at school   [] Fear of being unable to fall back asleep after wakening at night [] Falling asleep while driving   [] Laying in bed worrying when trying to sleep [] Recent change in sleep schedule   [] Waking up too early in the morning [] Shift work interfering with sleep   [] Sleep talking [] I use sleeping pills to help me sleep   [] Sweating a lot at night [] I use alcohol to help me sleep   [] Waking up with heartburn [] Pain interfering with sleep   [] Waking with reflux []        [place] Sleepiness Scale:    Sleep Medicine 11/4/2020   Sitting and reading 3   Watching TV 1   Sitting, inactive in a public place (e.g. a theatre or a meeting) 0   As a passenger in a car for an hour without a break 1 no distress. He was able to speak in full sentences, had no cough or shortness of breath. External eye exam was normal.  He had a class III airway with a relatively small uvula. He has had dental repair. He had a short and large neck. He denied leg edema    Current Outpatient Medications   Medication Sig Dispense Refill    omeprazole (PRILOSEC) 20 MG delayed release capsule Take 1 capsule by mouth daily 90 capsule 1     No current facility-administered medications for this visit. Data Reviewed:   CBC and Renal reviewed  Last CBC  Lab Results   Component Value Date    WBC 4.0 10/01/2019    RBC 5.18 10/01/2019    HGB 15.7 10/01/2019    MCV 88.4 10/01/2019     10/01/2019     Last Renal  Lab Results   Component Value Date     10/01/2019    K 4.4 10/01/2019     10/01/2019    CO2 23 10/01/2019    CO2 26 10/22/2018    CO2 27 09/26/2017    BUN 13 10/01/2019    CREATININE 0.9 10/01/2019    GLUCOSE 101 10/01/2019    CALCIUM 9.6 10/01/2019       Assessment:     · GARTH  · Sleep deprivation  · Bruxism  · GERD  · Essential hypertension  · Obesity    Plan:      1. Obstructive sleep apnea  Symptoms and the history provided by his wife likely represent a high clinical probability of GARTH. Recommend starting out with a home sleep study.  - Home Sleep Study; Future    2. Sleep deprivation  Unfortunately due to the nature of his work and unpredictability, he does have periods of sleep deprivation. Await results of sleep study  - Home Sleep Study; Future    3. Bruxism  Address once sleep study completed    4. Gastroesophageal reflux disease, unspecified whether esophagitis present  Could be related to her uncorrected GARTH, await results of sleep study    5. Essential hypertensionGood control of sleep apnea could improve blood pressure control after several months of treatment  - Home Sleep Study; Future    6. Obesity (BMI 30.0-34. 9)  We will discuss weight loss after sleep apnea adequately diagnosed and treated  - Home Sleep Study; Future    7. Prophylaxis  Recommend flu shot annually    RTC depending on results of sleep study    Evelia Hill is a 38 y.o. male being evaluated by a Virtual Visit (video visit) encounter to address concerns as mentioned above. A caregiver was present when appropriate. Due to this being a TeleHealth encounter (During UGXUG-01 public health emergency), evaluation of the following organ systems was limited: Vitals/Constitutional/EENT/Resp/CV/GI//MS/Neuro/Skin/Heme-Lymph-Imm. Pursuant to the emergency declaration under the [address] [place] [address], [address] [place] [address] authority and the [employer] and [employer] Act, this Virtual Visit was conducted with patient's (and/or legal guardian's) consent, to reduce the patient's risk of exposure to COVID-19 and provide necessary medical care. The patient (and/or legal guardian) has also been advised to contact this office for worsening conditions or problems, and seek emergency medical treatment and/or call 911 if deemed necessary. Patient identification was verified at the start of the visit: Yes    Total time spent for this encounter: Not billed by time    Services were provided through a video synchronous discussion virtually to substitute for in-person clinic visit. Patient and provider were located at their individual homes. --John Barroso MD on 11/26/2020 at 12:09 PM    An electronic signature was used to authenticate this note.

## 2020-11-04 NOTE — PATIENT INSTRUCTIONS
Remember to bring a list of pulmonary medications and any CPAP or BiPAP machines to your next appointment with the office. Please keep all of your future appointments scheduled by [address] [facility] office. Out of respect for other patients and providers, you may be asked to reschedule your appointment if you arrive later than your scheduled appointment time. Appointments cancelled less than 24hrs in advance will be considered a no show. Patients with three missed appointments within 1 year or four missed appointments within 2 years can be dismissed from the practice. You may receive a survey regarding the care you received during your visit. Your input is valuable to us. We encourage you to complete and return your survey. We hope you will choose us in the future for your healthcare needs. Pt instructed of all future appointment dates & times, including radiology, labs, procedures & referrals. If procedures were scheduled preparation instructions provided. Instructions on future appointments with [facility] were given.

## 2020-11-11 ENCOUNTER — OFFICE VISIT (OUTPATIENT)
Dept: FAMILY MEDICINE CLINIC | Age: 35
End: 2020-11-11
Payer: COMMERCIAL

## 2020-11-11 VITALS
DIASTOLIC BLOOD PRESSURE: 84 MMHG | OXYGEN SATURATION: 98 % | HEART RATE: 90 BPM | SYSTOLIC BLOOD PRESSURE: 162 MMHG | BODY MASS INDEX: 35.81 KG/M2 | TEMPERATURE: 97.1 F | WEIGHT: 279 LBS | HEIGHT: 74 IN

## 2020-11-11 PROCEDURE — 99395 PREV VISIT EST AGE 18-39: CPT | Performed by: PHYSICIAN ASSISTANT

## 2020-11-11 RX ORDER — OMEPRAZOLE 20 MG/1
20 CAPSULE, DELAYED RELEASE ORAL DAILY
Qty: 90 CAPSULE | Refills: 1 | Status: SHIPPED | OUTPATIENT
Start: 2020-11-11 | End: 2021-07-12

## 2020-11-11 ASSESSMENT — PATIENT HEALTH QUESTIONNAIRE - PHQ9
2. FEELING DOWN, DEPRESSED OR HOPELESS: 0
SUM OF ALL RESPONSES TO PHQ QUESTIONS 1-9: 0
SUM OF ALL RESPONSES TO PHQ9 QUESTIONS 1 & 2: 0
1. LITTLE INTEREST OR PLEASURE IN DOING THINGS: 0
SUM OF ALL RESPONSES TO PHQ QUESTIONS 1-9: 0
SUM OF ALL RESPONSES TO PHQ QUESTIONS 1-9: 0

## 2020-11-11 ASSESSMENT — ENCOUNTER SYMPTOMS
WHEEZING: 0
COUGH: 0
SHORTNESS OF BREATH: 0
BLOOD IN STOOL: 0
NAUSEA: 0
VOMITING: 0
CONSTIPATION: 0
DIARRHEA: 0

## 2020-11-11 NOTE — PROGRESS NOTES
2020    Leigh Menard (:  1985) is a 38 y.o. male, here for a preventive medicine evaluation. Patient Active Problem List   Diagnosis    GERD (gastroesophageal reflux disease)    Dyslipidemia    Hepatic steatosis     Dentist: no concerns  Vision: no concerns  Exercise: cardio on days that he works  Diet: Standard American Diet  Weight:  Stable, around 260    BP:  135/80 at work, has white coat hypertension. Denies chest pain, shortness of breath  Having headaches in the AM when he wakes up. Difficulty falling and staying asleep. P grandfather had colon cancer at the age of 50. Unknown genetic risk. Review of Systems   Constitutional: Negative for activity change, appetite change, fatigue, fever and unexpected weight change. Eyes: Negative for visual disturbance. Respiratory: Negative for cough, shortness of breath and wheezing. Cardiovascular: Negative for chest pain, palpitations and leg swelling. Gastrointestinal: Negative for blood in stool, constipation, diarrhea, nausea and vomiting. Endocrine: Negative for polydipsia, polyphagia and polyuria. Skin: Negative for pallor. Neurological: Negative for dizziness, light-headedness and headaches. Prior to Visit Medications    Medication Sig Taking?  Authorizing Provider   omeprazole (PRILOSEC) 20 MG delayed release capsule Take 1 capsule by mouth daily Yes ALFREDO Welch        Allergies   Allergen Reactions    Lisinopril      cough       Past Medical History:   Diagnosis Date    GERD (gastroesophageal reflux disease)     Headache     Hepatic steatosis 2017    Hypertension     off meds x 2 yrs    Lumbar herniated disc     Reflux        Past Surgical History:   Procedure Laterality Date    KNEE SURGERY      left torn meniscus    OTHER SURGICAL HISTORY Left 13     MICROSCOPIC LUMBAR LAMINOTOMY L4-L5 WITH EXCISION OF SYNOVIAL       Social History     Socioeconomic History    Marital status:      Spouse name: Not on file    Number of children: Not on file    Years of education: Not on file    Highest education level: Not on file   Occupational History    Not on file   Social Needs    Financial resource strain: Not on file    Food insecurity     Worry: Not on file     Inability: Not on file    Transportation needs     Medical: Not on file     Non-medical: Not on file   Tobacco Use    Smoking status: Former Smoker     Packs/day: 1.00     Years: 15.00     Pack years: 15.00     Types: Cigarettes     Start date: 4/22/1995     Last attempt to quit: 5/14/2010     Years since quitting: 10.5    Smokeless tobacco: Current User     Types: Chew   Substance and Sexual Activity    Alcohol use: No     Comment: rare    Drug use: No    Sexual activity: Yes     Partners: Female   Lifestyle    Physical activity     Days per week: Not on file     Minutes per session: Not on file    Stress: Not on file   Relationships    Social connections     Talks on phone: Not on file     Gets together: Not on file     Attends Worship service: Not on file     Active member of club or organization: Not on file     Attends meetings of clubs or organizations: Not on file     Relationship status: Not on file    Intimate partner violence     Fear of current or ex partner: Not on file     Emotionally abused: Not on file     Physically abused: Not on file     Forced sexual activity: Not on file   Other Topics Concern    Not on file   Social History Narrative    Not on file        Family History   Problem Relation Age of Onset    Diabetes Father     High Blood Pressure Father     Kidney Cancer Father     Heart Disease Father         stent- in his 52's    Colon Polyps Mother     Colon Cancer Maternal Grandfather 55    High Blood Pressure Paternal Grandfather        ADVANCE DIRECTIVE: N, <no information>    Vitals:    11/11/20 1347 11/11/20 1408   BP: (!) 150/90 (!) 162/84   Pulse: 90    Temp: 97.1 °F (36.2 °C) TempSrc: Temporal    SpO2: 98%    Weight: 279 lb (126.6 kg)    Height: 6' 2\" (1.88 m)      Estimated body mass index is 35.82 kg/m² as calculated from the following:    Height as of this encounter: 6' 2\" (1.88 m). Weight as of this encounter: 279 lb (126.6 kg). Physical Exam  Vitals signs reviewed. Constitutional:       Appearance: Normal appearance. HENT:      Head: Normocephalic and atraumatic. Eyes:      Extraocular Movements: Extraocular movements intact. Conjunctiva/sclera: Conjunctivae normal.      Pupils: Pupils are equal, round, and reactive to light. Cardiovascular:      Rate and Rhythm: Normal rate and regular rhythm. Heart sounds: Normal heart sounds. Pulmonary:      Effort: Pulmonary effort is normal.      Breath sounds: Normal breath sounds. Abdominal:      General: Abdomen is flat. Bowel sounds are normal.      Palpations: Abdomen is soft. There is no mass. Tenderness: There is no abdominal tenderness. Hernia: No hernia is present. Musculoskeletal:      Right lower leg: No edema. Left lower leg: No edema. Lymphadenopathy:      Cervical: No cervical adenopathy. Neurological:      Mental Status: He is alert and oriented to person, place, and time. Cranial Nerves: No cranial nerve deficit. No flowsheet data found. Lab Results   Component Value Date    CHOL 156 10/01/2019    CHOL 137 10/22/2018    CHOL 156 09/26/2017    TRIG 114 10/01/2019    TRIG 81 10/22/2018    TRIG 167 09/26/2017    HDL 34 10/01/2019    HDL 29 10/22/2018    HDL 26 09/26/2017    HDL 25 11/02/2011    LDLCALC 99 10/01/2019    LDLCALC 92 10/22/2018    LDLCALC 97 09/26/2017    GLUCOSE 101 10/01/2019    LABA1C 5.2 10/01/2019       The ASCVD Risk score (Mari Rivera., et al., 2013) failed to calculate for the following reasons:     The 2013 ASCVD risk score is only valid for ages 36 to 78    Immunization History   Administered Date(s) Administered    Tdap (Boostrix, Adacel) 01/01/2006, 06/05/2018       Health Maintenance   Topic Date Due    Flu vaccine (1) 09/01/2020    DTaP/Tdap/Td vaccine (3 - Td) 06/05/2028    Hepatitis A vaccine  Aged Out    Hepatitis B vaccine  Aged Out    Hib vaccine  Aged Out    Meningococcal (ACWY) vaccine  Aged Out    Pneumococcal 0-64 years Vaccine  Aged Out    Varicella vaccine  Discontinued    HIV screen  Discontinued       ASSESSMENT/PLAN:  1. Physical exam, annual  Healthy. Reviewed recent lab work. Has follow up with pulmonology for suspected sleep apnea. 2. Gastroesophageal reflux disease  -  Continue taking the medication every other day  - omeprazole (PRILOSEC) 20 MG delayed release capsule; Take 1 capsule by mouth daily  Dispense: 90 capsule; Refill: 1    3. White coat syndrome without hypertension  -  Well controlled at home. Pt to call if blood pressure is consistently elevated at 140/90. Return in about 6 months (around 5/11/2021) for GERD. An electronic signature was used to authenticate this note.     --ALFREDO Christopher on 11/11/2020 at 2:27 PM

## 2020-12-02 ENCOUNTER — HOSPITAL ENCOUNTER (OUTPATIENT)
Dept: SLEEP CENTER | Age: 35
Discharge: HOME | End: 2020-12-04
Payer: COMMERCIAL

## 2020-12-02 PROCEDURE — 95806 SLEEP STUDY UNATT&RESP EFFT: CPT

## 2020-12-03 PROCEDURE — 95806 SLEEP STUDY UNATT&RESP EFFT: CPT | Performed by: INTERNAL MEDICINE

## 2020-12-11 ENCOUNTER — PATIENT MESSAGE (OUTPATIENT)
Dept: FAMILY MEDICINE CLINIC | Age: 35
End: 2020-12-11

## 2020-12-11 NOTE — TELEPHONE ENCOUNTER
From: Letitia Villa  To: ALFREDO Ritchie  Sent: 12/11/2020 7:34 AM EST  Subject: Non-Urgent Medical Question    Good morning . . I'm sorry to bother you again. You had stated that you wanted me to keep you up to date on my BP. I've been monitoring my pressure and it has increased over the last 5 days. I've had a headache for that amount of time as well, that ibuprofen won't get rid of. Most recent pressure this morning was 179/112 . . which is obviously unacceptable and obviously causing my headache. So I guess I need to start on something. Let me know your thoughts when you get the chance. Thank you.  Ana Ta

## 2020-12-15 ENCOUNTER — VIRTUAL VISIT (OUTPATIENT)
Dept: PULMONOLOGY | Age: 35
End: 2020-12-15
Payer: COMMERCIAL

## 2020-12-15 ENCOUNTER — TELEPHONE (OUTPATIENT)
Dept: PULMONOLOGY | Age: 35
End: 2020-12-15

## 2020-12-15 PROCEDURE — 99213 OFFICE O/P EST LOW 20 MIN: CPT | Performed by: INTERNAL MEDICINE

## 2020-12-15 RX ORDER — LOSARTAN POTASSIUM AND HYDROCHLOROTHIAZIDE 12.5; 5 MG/1; MG/1
1 TABLET ORAL DAILY
COMMUNITY
End: 2020-12-17 | Stop reason: SDUPTHER

## 2020-12-15 NOTE — TELEPHONE ENCOUNTER
Pt completed virtual visit today with Dr. Freddie Danielson. MD ordered AutoCPAP and [facility]RE[facility] FU for compliance. Pt wants to choose the DME. When I spoke with Vinh Medina this morning, I told him that his VM is full so I can't leave him messages. But if saw that he missed a call from our office, that means I need him to call me back. Will await his return call.

## 2020-12-15 NOTE — PROGRESS NOTES
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 He goes to bed around 11 PM, wakes up around 6 AM.  He works for 4 days, then has 2 days off, then 1 day of work. He then goes to the same cycle again. There are times when he is has been awake for 24 hours. He usually does not have any difficulty falling asleep, he turns the TV off before he falls asleep. He is often reading on his tablet. He occasionally wakes up to go to the bathroom, has no difficulty falling asleep on returning. He does not drink any coffee, does drink a Monster drink in the morning. He does not take any naps, but is sleepy in the afternoon. His wife has noted very loud snoring and occasional apneic episodes, choking. He does have a history of bruxism. He is a mouth breather. He does have reflux every other day or so. He has done long distance driving, is not very sleepy at the wheel. He keeps his bedroom dark and cool, temperature between 64 and 65 °F.  He does have 2 cats that do get into the bed, but they do not appear to disturb his sleep. He had gained weight up to 300 pounds, now fluctuates between 260 and 265 pounds. 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 There was no other significant past medical or surgical history, his medications and allergies were reviewed. The rest of his ROS was unremarkable.     [place] Sleepiness Scale:    Sleep Medicine 11/4/2020   Sitting and reading 3   Watching TV 1   Sitting, inactive in a public place (e.g. a theatre or a meeting) 0   As a passenger in a car for an hour without a break 1   Lying down to rest in the afternoon when circumstances permit 2   Sitting and talking to someone 0   Sitting quietly after a lunch without alcohol 1   In a car, while stopped for a few minutes in traffic 0   Total score 8       PAST MEDICAL HISTORY:  Past Medical History:   Diagnosis Date    GERD (gastroesophageal reflux disease)     Headache  Hepatic steatosis 9/26/2017    Hypertension     off meds x 2 yrs    Lumbar herniated disc     Obstructive sleep apnea     Reflux        PAST SURGICAL HISTORY:  Past Surgical History:   Procedure Laterality Date    KNEE SURGERY  2005    left torn meniscus    OTHER SURGICAL HISTORY Left 11/8/13     MICROSCOPIC LUMBAR LAMINOTOMY L4-L5 WITH EXCISION OF SYNOVIAL       FAMILY HISTORY:  family history includes Colon Cancer (age of onset: 55) in his maternal grandfather; Colon Polyps in his mother; Diabetes in his father; Heart Disease in his father; High Blood Pressure in his father and paternal grandfather; Kidney Cancer in his father. SOCIAL HISTORY:   reports that he quit smoking about 13 years ago. His smoking use included cigarettes. He started smoking about 25 years ago. He has a 15.00 pack-year smoking history. His smokeless tobacco use includes chew. ALLERGIES:  Patient is allergic to lisinopril. REVIEW OF SYSTEMS:  Constitutional: Negative for fever, positive for weight gain  HENT: Negative for sore throat  Eyes: Negative for redness   Respiratory: Negative for dyspnea, cough  Cardiovascular: Negative for chest pain  Gastrointestinal: Negative for vomiting, diarrhea   Genitourinary: Negative for hematuria   Musculoskeletal: Negative for arthralgias   Skin: Negative for rash  Neurological: Negative for syncope  Hematological: Negative for adenopathy  Psychiatric/Behavorial: Negative for anxiety    Objective:   PHYSICAL EXAM:  There were no vitals taken for this visit. Limited physical exam was performed as this was a virtual visit through [employer] me. The patient is well-built, in no distress. He was able to speak in full sentences, had no cough or shortness of breath. External eye exam was normal.  He had a class III airway with a relatively small uvula. He has had dental repair. He had a short and large neck.   He denied leg edema    Current Outpatient Medications Medication Sig Dispense Refill    losartan-hydroCHLOROthiazide (HYZAAR) 50-12.5 MG per tablet Take 1 tablet by mouth daily      omeprazole (PRILOSEC) 20 MG delayed release capsule Take 1 capsule by mouth daily 90 capsule 1     No current facility-administered medications for this visit. Data Reviewed:   CBC and Renal reviewed  Last CBC  Lab Results   Component Value Date    WBC 4.0 10/01/2019    RBC 5.18 10/01/2019    HGB 15.7 10/01/2019    MCV 88.4 10/01/2019     10/01/2019     Last Renal  Lab Results   Component Value Date     10/01/2019    K 4.4 10/01/2019     10/01/2019    CO2 23 10/01/2019    CO2 26 10/22/2018    CO2 27 09/26/2017    BUN 13 10/01/2019    CREATININE 0.9 10/01/2019    GLUCOSE 101 10/01/2019    CALCIUM 9.6 10/01/2019       Assessment:     · GARTH  · Sleep deprivation  · Bruxism  · GERD  · Essential hypertension  · Obesity    Plan:      1. Obstructive sleep apnea  Results of home sleep study discussed with him. He has mild GARTH, but it is possible that the study underestimated the degree of sleep apnea. I am concerned about the morning headaches, I have recommended a trial of auto CPAP. The importance of compliance was discussed with him, cleaning of the mask, hose and humidifier was discussed in detail. He expressed understanding and is agreeable to proceed. I asked him to call me if he cannot acclimate to the machine, and if he cannot tolerate CPAP at all, will consider a mandibular advancement device. 2. Sleep deprivation  Unfortunately due to the nature of his work and unpredictability, he does have periods of sleep deprivation. 3. Bruxism  Mild symptoms, await treatment of GARTH    4. Gastroesophageal reflux disease, unspecified whether esophagitis present  Could be related to uncorrected GARTH, await results of CPAP therapy    5. Essential hypertension.  Good control of sleep apnea could improve blood pressure control after several months of treatment 6. Obesity (BMI 30.0-34. 9)  We will discuss weight loss after sleep apnea adequately treated    7. Prophylaxis  Recommend flu shot annually, he declines. He is agreeable to getting the COVID-19 vaccine. RTC for compliance, call earlier if unable to tolerate CPAP CPAP     Malini Landeros is a 38 y.o. male being evaluated by a Virtual Visit (video visit) encounter to address concerns as mentioned above. A caregiver was present when appropriate. Due to this being a TeleHealth encounter (During MZUSE-03 public health emergency), evaluation of the following organ systems was limited: Vitals/Constitutional/EENT/Resp/CV/GI//MS/Neuro/Skin/Heme-Lymph-Imm. Pursuant to the emergency declaration under the [address] [place] [address] authority and the [employer] Act, this Virtual Visit was conducted with patient's (and/or legal guardian's) consent, to reduce the patient's risk of exposure to COVID-19 and provide necessary medical care. The patient (and/or legal guardian) has also been advised to contact this office for worsening conditions or problems, and seek emergency medical treatment and/or call 911 if deemed necessary. Patient identification was verified at the start of the visit: Yes    Total time spent for this encounter: 17 minutes    Services were provided through a video synchronous discussion virtually to substitute for in-person clinic visit. Patient and provider were located at their individual homes. --Omar Cervantes MD on 12/15/2020 at 9:16 AM    An electronic signature was used to authenticate this note.

## 2020-12-15 NOTE — TELEPHONE ENCOUNTER
Pt called back. He said his wife has been using [employer] for her DME but he's going to do some research on some of the other companies and will call back when he decides who he wants to go with. I advised Kamron Re that we'll also need to schedule him for  31-90 day VV with Dr. Emmy Briceño to review compliance.

## 2020-12-17 RX ORDER — LOSARTAN POTASSIUM AND HYDROCHLOROTHIAZIDE 12.5; 5 MG/1; MG/1
1 TABLET ORAL DAILY
Qty: 90 TABLET | Refills: 1 | Status: SHIPPED | OUTPATIENT
Start: 2020-12-17 | End: 2021-08-18

## 2020-12-22 NOTE — TELEPHONE ENCOUNTER
Received a fax today indicating [employer] received the CPAP orders and they're currently processing the order. I called [employer] to make sure they had all the info they needed and was told that they do. PA is pending with insurance. Documents scanned under media.

## 2021-01-06 ENCOUNTER — OFFICE VISIT (OUTPATIENT)
Dept: ORTHOPEDIC SURGERY | Age: 36
End: 2021-01-06
Payer: COMMERCIAL

## 2021-01-06 VITALS — BODY MASS INDEX: 35.81 KG/M2 | HEIGHT: 74 IN | WEIGHT: 279 LBS

## 2021-01-06 DIAGNOSIS — M54.50 LOW BACK PAIN POTENTIALLY ASSOCIATED WITH RADICULOPATHY: Primary | ICD-10-CM

## 2021-01-06 PROCEDURE — 99203 OFFICE O/P NEW LOW 30 MIN: CPT | Performed by: PHYSICIAN ASSISTANT

## 2021-01-06 RX ORDER — METHYLPREDNISOLONE 4 MG/1
TABLET ORAL
Qty: 1 KIT | Refills: 0 | Status: SHIPPED | OUTPATIENT
Start: 2021-01-06 | End: 2021-01-12

## 2021-01-06 NOTE — PROGRESS NOTES
New Patient: LUMBAR SPINE    CHIEF COMPLAINT:    Chief Complaint   Patient presents with    Back Problem       HISTORY OF PRESENT ILLNESS:       Mr. Carroll Esteban is a pleasant 38 y.o. male 7 years s/p L5-S1 microlumbar discectomy by Dr. Olivier Christian, here for consultation regarding his LBP. He states his current pain began about 2 weeks ago at work after bending to turn on a fire hydrant. His pain has improved some since then. He rates his back pain 7/10 and leg pain 0/10. He describes the pain as aching. Pain is worse with prolonged sitting or rising from a seated position, and improved some with standing, walking, or lying down. Initially he would notice intermittent, brief pain in his left leg with lifting or bending. At this time, his pain is primarily localized to his lower lumbar spine. His pain initially was worst on his left lumbar spine, but moved to the right side of his lumbar spine yesterday. He denies lower extremity radicular pain, numbness or weakness. He denies saddle numbness or  bowel or bladder dysfunction. The pain does not disrupt his sleep.      Current/Past Treatment:   · Physical Therapy: Home exercise program  · Chiropractic:  Yes   · Injection:  No   · Medications:  Aleve, Tylenol      Past Medical History:   Past Medical History:   Diagnosis Date    GERD (gastroesophageal reflux disease)     Headache     Hepatic steatosis 9/26/2017    Hypertension     off meds x 2 yrs    Lumbar herniated disc     Obstructive sleep apnea     Reflux         Past Surgical History:     Past Surgical History:   Procedure Laterality Date    KNEE SURGERY  2005    left torn meniscus    OTHER SURGICAL HISTORY Left 11/8/13     MICROSCOPIC LUMBAR LAMINOTOMY L4-L5 WITH EXCISION OF SYNOVIAL       Current Medications:     Current Outpatient Medications:     losartan-hydroCHLOROthiazide (HYZAAR) 50-12.5 MG per tablet, Take 1 tablet by mouth daily, Disp: 90 tablet, Rfl: 1   omeprazole (PRILOSEC) 20 MG delayed release capsule, Take 1 capsule by mouth daily, Disp: 90 capsule, Rfl: 1    Allergies:  Lisinopril    Social History:    reports that he quit smoking about 13 years ago. His smoking use included cigarettes. He started smoking about 25 years ago. He has a 15.00 pack-year smoking history. His smokeless tobacco use includes chew. He reports that he does not drink alcohol or use drugs. Family History:   Family History   Problem Relation Age of Onset    Diabetes Father     High Blood Pressure Father     Kidney Cancer Father     Heart Disease Father         stent- in his 52's    Colon Polyps Mother     Colon Cancer Maternal Grandfather 55    High Blood Pressure Paternal Grandfather        REVIEW OF SYSTEMS: Full ROS noted & scanned   CONSTITUTIONAL: Denies unexplained weight loss, fevers, chills or fatigue  NEUROLOGICAL: Denies unsteady gait or progressive weakness  MUSCULOSKELETAL: Denies joint swelling or redness  PSYCHOLOGICAL: Denies anxiety, depression   SKIN: Denies skin changes, delayed healing, rash, itching   HEMATOLOGIC: Denies easy bleeding or bruising  ENDOCRINE: Denies excessive thirst, urination, heat/cold  RESPIRATORY: Denies current dyspnea, cough  GI: Denies nausea, vomiting, diarrhea   : Denies bowel or bladder issues      PHYSICAL EXAM:    Vitals: Height 6' 2\" (1.88 m), weight 279 lb (126.6 kg). GENERAL EXAM:  · General Apparence: Patient is adequately groomed with no evidence of malnutrition. · Orientation: The patient is oriented to time, place and person. · Mood & Affect:The patient's mood and affect are appropriate. · Vascular: Examination reveals no swelling tenderness in upper or lower extremities. Good capillary refill.   · Lymphatic: The lymphatic examination bilaterally reveals all areas to be without enlargement or induration  · Sensation: Sensation is intact without deficit · Coordination/Balance: Good coordination. Tandem walking normal.     LUMBAR/SACRAL EXAMINATION:  · Inspection: Local inspection shows no step-off or bruising. Lumbar alignment is normal.  Positive surgical scar. · Palpation:   No evidence of tenderness at the midline. No tenderness bilaterally at the paraspinal or trochanters. There is no step-off or paraspinal spasm. · Range of Motion: Lumbar flexion, extension and rotation are mildly limited due to pain. · Strength:   Strength testing is 5/5 in all muscle groups tested. · Special Tests:   Straight leg raise and crossed SLR negative. Leg length and pelvis level. · Skin: There are no rashes, ulcerations or lesions. · Reflexes: Reflexes are symmetrically 2+ at the patellar and ankle tendons. Clonus absent bilaterally at the feet. · Gait & station: normal, patient ambulates without assistance. Able to heel walk and toe walk without difficulty. · Additional Examinations:   · RIGHT LOWER EXTREMITY: Inspection/examination of the right lower extremity does not show any tenderness, deformity or injury. Range of motion is unremarkable. There is no gross instability. There are no rashes, ulcerations or lesions. Strength and tone are normal.  · LEFT LOWER EXTREMITY:  Inspection/examination of the left lower extremity does not show any tenderness, deformity or injury. Range of motion is unremarkable. There is no gross instability. There are no rashes, ulcerations or lesions. Strength and tone are normal.    Diagnostic Testing:    I independently reviewed AP and lateral xray images of his lumbar spine from the office today. They show mild lower lumbar spondylosis and facet arthropathy. No obvious acute fracture noted.      Impression:   Lumbar spondylosis without radiculopathy    Plan:

## 2021-01-12 RX ORDER — LOSARTAN POTASSIUM 50 MG/1
50 TABLET ORAL DAILY
Qty: 30 TABLET | Refills: 5 | Status: SHIPPED | OUTPATIENT
Start: 2021-01-12 | End: 2021-07-12

## 2021-01-15 ENCOUNTER — TELEPHONE (OUTPATIENT)
Dept: PULMONOLOGY | Age: 36
End: 2021-01-15

## 2021-01-20 NOTE — TELEPHONE ENCOUNTER
SW patient. He plans to call [facility] today to get rescheduled for CPAP setup. Pt got called into work and that is why he missed his appt.

## 2021-07-12 DIAGNOSIS — K21.9 GASTROESOPHAGEAL REFLUX DISEASE, UNSPECIFIED WHETHER ESOPHAGITIS PRESENT: ICD-10-CM

## 2021-07-12 RX ORDER — LOSARTAN POTASSIUM 50 MG/1
50 TABLET ORAL DAILY
Qty: 30 TABLET | Refills: 5 | Status: SHIPPED | OUTPATIENT
Start: 2021-07-12 | End: 2022-02-15

## 2021-07-12 RX ORDER — OMEPRAZOLE 20 MG/1
20 CAPSULE, DELAYED RELEASE ORAL DAILY
Qty: 90 CAPSULE | Refills: 1 | Status: SHIPPED | OUTPATIENT
Start: 2021-07-12 | End: 2022-02-15

## 2021-07-12 NOTE — TELEPHONE ENCOUNTER
Last office visit 11/11/2020     Last written Losartan 1- x 5 refills                        Prilosec 11- x 1 refill    Next office visit scheduled Not scheduled    Requested Prescriptions     Pending Prescriptions Disp Refills    losartan (COZAAR) 50 MG tablet [Pharmacy Med Name: LOSARTAN POT 50 MG TABS] 30 tablet 5     Sig: TAKE 1 TABLET BY MOUTH DAILY    omeprazole (PRILOSEC) 20 MG delayed release capsule [Pharmacy Med Name: OMEPRAZOLE DR 20 MG CAPS] 90 capsule 1     Sig: TAKE 1 CAPSULE BY MOUTH DAILY

## 2021-08-18 ENCOUNTER — OFFICE VISIT (OUTPATIENT)
Dept: FAMILY MEDICINE CLINIC | Age: 36
End: 2021-08-18
Payer: COMMERCIAL

## 2021-08-18 VITALS
OXYGEN SATURATION: 98 % | DIASTOLIC BLOOD PRESSURE: 84 MMHG | BODY MASS INDEX: 34.83 KG/M2 | HEART RATE: 91 BPM | WEIGHT: 271.4 LBS | SYSTOLIC BLOOD PRESSURE: 158 MMHG | HEIGHT: 74 IN

## 2021-08-18 DIAGNOSIS — Z80.0 FAMILY HISTORY OF COLON CANCER: ICD-10-CM

## 2021-08-18 DIAGNOSIS — I10 ESSENTIAL HYPERTENSION: ICD-10-CM

## 2021-08-18 DIAGNOSIS — Z00.00 PHYSICAL EXAM, ANNUAL: Primary | ICD-10-CM

## 2021-08-18 PROCEDURE — 99395 PREV VISIT EST AGE 18-39: CPT | Performed by: PHYSICIAN ASSISTANT

## 2021-08-18 ASSESSMENT — PATIENT HEALTH QUESTIONNAIRE - PHQ9
SUM OF ALL RESPONSES TO PHQ QUESTIONS 1-9: 0
SUM OF ALL RESPONSES TO PHQ9 QUESTIONS 1 & 2: 0
2. FEELING DOWN, DEPRESSED OR HOPELESS: 0
1. LITTLE INTEREST OR PLEASURE IN DOING THINGS: 0

## 2021-08-18 ASSESSMENT — ENCOUNTER SYMPTOMS
SHORTNESS OF BREATH: 0
CONSTIPATION: 0
VOMITING: 0
NAUSEA: 0
ABDOMINAL PAIN: 0
DIARRHEA: 0
WHEEZING: 0

## 2021-08-18 NOTE — PROGRESS NOTES
2021    Rekha Gómez (:  1985) is a 43 y.o. male, here for a preventive medicine evaluation. Patient Active Problem List   Diagnosis    GERD (gastroesophageal reflux disease)    Dyslipidemia    Hepatic steatosis    Obstructive sleep apnea     HTN:  135/70 at work  Reports a hx of white coat hypertension  Current medication: losartan  Side effects: none  Pt stopped hydrochlorothiazide due to flank pain that resolved with the cessation of this medication  He denies any current chest tightness, shortness of breath, headaches, or lower extremity edema    GARTH:  Pt will be following up with a dentist for an oral device since he was unable to tolerate the CPAP    +family hx of colon cancer in his maternal grandfather at age 55. He is interested in early screening. Will have him follow up with GI    Review of Systems   Constitutional: Negative for diaphoresis, fatigue and unexpected weight change. Eyes: Negative for visual disturbance. Respiratory: Negative for shortness of breath and wheezing. Cardiovascular: Negative for chest pain, palpitations and leg swelling. Gastrointestinal: Negative for abdominal pain, constipation, diarrhea, nausea and vomiting. Endocrine: Negative for cold intolerance, heat intolerance, polydipsia, polyphagia and polyuria. Neurological: Negative for dizziness, light-headedness and headaches. Hematological: Negative for adenopathy. Psychiatric/Behavioral: Positive for sleep disturbance. Prior to Visit Medications    Medication Sig Taking?  Authorizing Provider   losartan (COZAAR) 50 MG tablet TAKE 1 TABLET BY MOUTH DAILY Yes ALFREDO Jameson   omeprazole (PRILOSEC) 20 MG delayed release capsule TAKE 1 CAPSULE BY MOUTH DAILY Yes ALFREDO Jameson        Allergies   Allergen Reactions    Lisinopril      cough       Past Medical History:   Diagnosis Date    GERD (gastroesophageal reflux disease)     Headache     Hepatic steatosis 2017  Hypertension     off meds x 2 yrs    Lumbar herniated disc     Obstructive sleep apnea     Reflux        Past Surgical History:   Procedure Laterality Date    KNEE SURGERY  2005    left torn meniscus    OTHER SURGICAL HISTORY Left 13     MICROSCOPIC LUMBAR LAMINOTOMY L4-L5 WITH EXCISION OF SYNOVIAL       Social History     Socioeconomic History    Marital status:      Spouse name: Not on file    Number of children: Not on file    Years of education: Not on file    Highest education level: Not on file   Occupational History    Not on file   Tobacco Use    Smoking status: Former Smoker     Packs/day: 1.00     Years: 15.00     Pack years: 15.00     Types: Cigarettes     Start date: 1995     Quit date: 2007     Years since quittin.2    Smokeless tobacco: Current User     Types: Chew   Vaping Use    Vaping Use: Never used   Substance and Sexual Activity    Alcohol use: No     Comment: rare    Drug use: No    Sexual activity: Yes     Partners: Female   Other Topics Concern    Not on file   Social History Narrative    Not on file     Social Determinants of Health     Financial Resource Strain:     Difficulty of Paying Living Expenses:    Food Insecurity:     Worried About Running Out of Food in the Last Year:     Ran Out of Food in the Last Year:    Transportation Needs:     Lack of Transportation (Medical):      Lack of Transportation (Non-Medical):    Physical Activity:     Days of Exercise per Week:     Minutes of Exercise per Session:    Stress:     Feeling of Stress :    Social Connections:     Frequency of Communication with Friends and Family:     Frequency of Social Gatherings with Friends and Family:     Attends Latter day Services:     Active Member of Clubs or Organizations:     Attends Club or Organization Meetings:     Marital Status:    Intimate Partner Violence:     Fear of Current or Ex-Partner:     Emotionally Abused:     Physically Abused:  Sexually Abused:         Family History   Problem Relation Age of Onset    Diabetes Father     High Blood Pressure Father     Kidney Cancer Father     Heart Disease Father         stent- in his 52's    Colon Polyps Mother     Colon Cancer Maternal Grandfather 55    High Blood Pressure Paternal Grandfather        ADVANCE DIRECTIVE: N, <no information>    Vitals:    08/18/21 1512 08/18/21 1626   BP: (!) 160/84 (!) 158/84   Site: Right Upper Arm    Position: Sitting    Cuff Size: Medium Adult    Pulse: 91    SpO2: 98%    Weight: 271 lb 6.4 oz (123.1 kg)    Height: 6' 2\" (1.88 m)      Estimated body mass index is 34.85 kg/m² as calculated from the following:    Height as of this encounter: 6' 2\" (1.88 m). Weight as of this encounter: 271 lb 6.4 oz (123.1 kg). Physical Exam  Vitals reviewed. Constitutional:       Appearance: Normal appearance. HENT:      Head: Normocephalic and atraumatic. Cardiovascular:      Rate and Rhythm: Normal rate and regular rhythm. Heart sounds: Normal heart sounds. Pulmonary:      Effort: Pulmonary effort is normal.      Breath sounds: Normal breath sounds. Abdominal:      General: Bowel sounds are normal.      Palpations: Abdomen is soft. Musculoskeletal:      Right lower leg: No edema. Left lower leg: No edema. Skin:     Coloration: Skin is not pale. Findings: No erythema. Neurological:      Mental Status: He is alert and oriented to person, place, and time. Cranial Nerves: No cranial nerve deficit. No flowsheet data found.     Lab Results   Component Value Date    CHOL 156 10/01/2019    CHOL 137 10/22/2018    CHOL 156 09/26/2017    TRIG 114 10/01/2019    TRIG 81 10/22/2018    TRIG 167 09/26/2017    HDL 34 10/01/2019    HDL 29 10/22/2018    HDL 26 09/26/2017    HDL 25 11/02/2011    LDLCALC 99 10/01/2019    LDLCALC 92 10/22/2018    LDLCALC 97 09/26/2017    GLUCOSE 101 10/01/2019    LABA1C 5.2 10/01/2019       The ASCVD Risk score (Gena Taylor, et al., 2013) failed to calculate for the following reasons: The 2013 ASCVD risk score is only valid for ages 36 to 78    Immunization History   Administered Date(s) Administered    COVID-19, Moderna, PF, 100mcg/0.5mL 12/28/2020, 01/28/2021    Tdap (Boostrix, Adacel) 01/01/2006, 06/05/2018       Health Maintenance   Topic Date Due    Potassium monitoring  10/01/2020    Creatinine monitoring  10/01/2020    Flu vaccine (1) 09/01/2021    DTaP/Tdap/Td vaccine (3 - Td or Tdap) 06/05/2028    COVID-19 Vaccine  Completed    Hepatitis C screen  Completed    Hepatitis A vaccine  Aged Out    Hepatitis B vaccine  Aged Out    Hib vaccine  Aged Out    Meningococcal (ACWY) vaccine  Aged Out    Pneumococcal 0-64 years Vaccine  Aged Out    Varicella vaccine  Discontinued    HIV screen  Discontinued          ASSESSMENT/PLAN:  1. Physical exam, annual  -     COMPREHENSIVE METABOLIC PANEL; Future  -     CBC Auto Differential; Future  2. Essential hypertension       -     Continue with losartan. Follow up in 6 months. Elevated in office due to white coat hypertension  3. Family history of colon cancer  -     Nik Madsen MD, Gastroenterology, [facility]  -     Pt is interested in testing      Return in about 6 months (around 2/18/2022) for HTN. An electronic signature was used to authenticate this note.     --ALFREDO Garcia on 8/18/2021 at 4:27 PM

## 2021-11-16 ENCOUNTER — INITIAL CONSULT (OUTPATIENT)
Dept: GASTROENTEROLOGY | Age: 36
End: 2021-11-16
Payer: COMMERCIAL

## 2021-11-16 VITALS
DIASTOLIC BLOOD PRESSURE: 113 MMHG | HEART RATE: 88 BPM | HEIGHT: 74 IN | WEIGHT: 277.4 LBS | BODY MASS INDEX: 35.6 KG/M2 | SYSTOLIC BLOOD PRESSURE: 176 MMHG

## 2021-11-16 DIAGNOSIS — Z80.0 FAMILY HISTORY OF COLON CANCER: Primary | ICD-10-CM

## 2021-11-16 PROCEDURE — 99203 OFFICE O/P NEW LOW 30 MIN: CPT | Performed by: INTERNAL MEDICINE

## 2021-11-16 NOTE — PROGRESS NOTES
[address] [facility],  [address] [place] [address] Ave  [place], [address] [place] [address]  Phone: [phone]   [facility]     Chief Complaint   Patient presents with    New Patient     family Hx of colon cancer       HPI     Thank you ALFREDO Schultz for asking me to see Ariel Giordano in consultation. Ariel Giordano is a 43 y.o. male  [2] White (non-) [1] with medical history of obesity (BMI 34.8), dyslipidemia, obstructive sleep apnea, GERD and fatty liver seen independently with referral for family history of colon cancer. Reports colon cancer in materanal grandfather at age 39. Mother has colon polyps. Denies abdominal pain, nausea, vomiting, fever, chills, unintentional weight loss, constipation, diarrhea, hematochezia or melenic stools. Last Encounter Reviewed: none  Pertinent PMH, FH, SH is reviewed below. Last EGD: None  Last Colonoscopy: None    Review of available records reveals:   Wt Readings from Last 50 Encounters:   11/16/21 277 lb 6.4 oz (125.8 kg)   08/18/21 271 lb 6.4 oz (123.1 kg)   01/06/21 279 lb (126.6 kg)   11/11/20 279 lb (126.6 kg)   10/01/19 270 lb (122.5 kg)   11/06/18 262 lb (118.8 kg)   10/22/18 255 lb (115.7 kg)   06/05/18 280 lb (127 kg)   12/01/17 278 lb (126.1 kg)   10/30/17 272 lb (123.4 kg)   09/26/17 277 lb (125.6 kg)   04/26/16 264 lb 15.9 oz (120.2 kg)   04/08/16 264 lb 15.9 oz (120.2 kg)   02/26/16 264 lb 15.9 oz (120.2 kg)   02/11/16 265 lb (120.2 kg)   11/21/13 270 lb (122.5 kg)   11/08/13 270 lb (122.5 kg)   11/04/13 270 lb (122.5 kg)   11/05/13 270 lb (122.5 kg)   11/04/13 270 lb (122.5 kg)   10/31/13 270 lb (122.5 kg)   10/15/13 270 lb (122.5 kg)   10/09/13 270 lb (122.5 kg)   10/08/13 270 lb (122.5 kg)   10/07/13 270 lb 1 oz (122.5 kg)   09/27/13 270 lb (122.5 kg)   08/09/13 274 lb (124.3 kg)   05/21/12 279 lb (126.6 kg)   11/02/11 288 lb (130.6 kg)   01/13/11 300 lb (136.1 kg)   05/19/10 285 lb (129.3 kg)       No components found for: HGBA1C  BP Readings from Last 3 Encounters:   21 (!) 176/113   21 (!) 158/84   20 (!) 162/84     Health Maintenance   Topic Date Due    Flu vaccine (1) Never done    COVID-19 Vaccine (2 - Pfizer 2-dose series) 2021    Potassium monitoring  2022    Creatinine monitoring  2022    DTaP/Tdap/Td vaccine (3 - Td or Tdap) 2028    Hepatitis C screen  Completed    Hepatitis A vaccine  Aged Out    Hepatitis B vaccine  Aged Out    Hib vaccine  Aged Out    Meningococcal (ACWY) vaccine  Aged Out    Pneumococcal 0-64 years Vaccine  Aged Out    Varicella vaccine  Discontinued    HIV screen  Discontinued       No components found for: UMicIt     PAST MEDICAL HISTORY     Past Medical History:   Diagnosis Date    GERD (gastroesophageal reflux disease)     Headache     Hepatic steatosis 2017    Hypertension     off meds x 2 yrs    Lumbar herniated disc     Obstructive sleep apnea     Reflux      FAMILY HISTORY     Family History   Problem Relation Age of Onset    Diabetes Father     High Blood Pressure Father     Kidney Cancer Father     Heart Disease Father         stent- in his 52's   [place] Colon Polyps Mother     Colon Cancer Maternal Grandfather 55    High Blood Pressure Paternal Grandfather      SOCIAL HISTORY     Social History     Socioeconomic History    Marital status:      Spouse name: Not on file    Number of children: Not on file    Years of education: Not on file    Highest education level: Not on file   Occupational History    Not on file   Tobacco Use    Smoking status: Former Smoker     Packs/day: 1.00     Years: 15.00     Pack years: 15.00     Types: Cigarettes     Start date: 1995     Quit date: 2007     Years since quittin.5    Smokeless tobacco: Current User     Types: Chew   Vaping Use    Vaping Use: Never used   Substance and Sexual Activity    Alcohol use: No     Comment: rare    Drug use: No    Sexual activity: Yes     Partners: Female   Other Topics Concern    Not on file   Social History Narrative    Not on file     Social Determinants of Health     Financial Resource Strain:     Difficulty of Paying Living Expenses: Not on file   Food Insecurity:     Worried About Running Out of Food in the Last Year: Not on file    Bib of Food in the Last Year: Not on file   Transportation Needs:     Lack of Transportation (Medical): Not on file    Lack of Transportation (Non-Medical):  Not on file   Physical Activity:     Days of Exercise per Week: Not on file    Minutes of Exercise per Session: Not on file   Stress:     Feeling of Stress : Not on file   Social Connections:     Frequency of Communication with Friends and Family: Not on file    Frequency of Social Gatherings with Friends and Family: Not on file    Attends Restorationist Services: Not on file    Active Member of [address] [place] [address] [employer] or Organizations: Not on file    Attends Club or Organization Meetings: Not on file    Marital Status: Not on file   Intimate Partner Violence:     Fear of Current or Ex-Partner: Not on file    Emotionally Abused: Not on file    Physically Abused: Not on file    Sexually Abused: Not on file   Housing Stability:     Unable to Pay for Housing in the Last Year: Not on file    Number of [place] in the Last Year: Not on file    Unstable Housing in the Last Year: Not on file     SURGICAL HISTORY     Past Surgical History:   Procedure Laterality Date    KNEE SURGERY  2005    left torn meniscus    OTHER SURGICAL HISTORY Left 11/8/13     MICROSCOPIC LUMBAR LAMINOTOMY L4-L5 WITH EXCISION OF SYNOVIAL     CURRENT MEDICATIONS   (This list may include medications prescribed during this encounter as epic can not insert only the list prior to this encounter.)  Current Outpatient Rx   Medication Sig Dispense Refill    losartan (COZAAR) 50 MG tablet TAKE 1 TABLET BY MOUTH DAILY 30 tablet 5    omeprazole (PRILOSEC) 20 MG delayed release capsule TAKE 1 CAPSULE BY MOUTH DAILY 90 capsule 1     ALLERGIES     Allergies   Allergen Reactions    Lisinopril      cough     IMMUNIZATIONS     Immunization History   Administered Date(s) Administered    COVID-19, Pfizer, PF, 30mcg/0.3mL 11/04/2021    Tdap (Boostrix, Adacel) 01/01/2006, 06/05/2018     REVIEW OF SYSTEMS   See HPI for further details and pertinent postiives. Negative for the following:  Constitutional: Negative for weight change. Negative for appetite change and fatigue. HENT: Negative for nosebleeds, sore throat, mouth sores, and voice change. Respiratory: Negative for cough, choking and chest tightness. Cardiovascular: Negative for chest pain   Gastrointestinal: See HPI  Musculoskeletal: Negative for arthralgias. Skin: Negative for pallor. Neurological: Negative for weakness and light-headedness. Hematological: Negative for adenopathy. Does not bruise/bleed easily. Psychiatric/Behavioral: Negative for suicidal ideas. PHYSICAL EXAM   VITAL SIGNS: BP (!) 176/113 (Site: Left Wrist, Position: Sitting, Cuff Size: Medium Adult)   Pulse 88   Ht 6' 2\" (1.88 m)   Wt 277 lb 6.4 oz (125.8 kg)   BMI 35.62 kg/m²   Wt Readings from Last 3 Encounters:   11/16/21 277 lb 6.4 oz (125.8 kg)   08/18/21 271 lb 6.4 oz (123.1 kg)   01/06/21 279 lb (126.6 kg)     Constitutional: Well developed, Well nourished, No acute distress, Non-toxic appearance. HENT: Normocephalic, Atraumatic, Bilateral external ears normal, Oropharynx moist, Nose normal.   Eyes: Conjunctiva normal, No discharge. Neck: Normal range of motion, No tenderness, Supple  Cardiovascular: Normal heart rate, Normal rhythm, No murmurs, No rubs, No gallops. Thorax & Lungs: Normal breath sounds, No respiratory distress, No wheezing, No chest tenderness  Abdomen:  normal bowel sounds, soft, non tender, non distended, no hernias   Rectal:  Deferred. Skin: Warm, Dry, No erythema, No rash. No bruising.    Lower Extremities:

## 2021-11-18 ENCOUNTER — TELEPHONE (OUTPATIENT)
Dept: GASTROENTEROLOGY | Age: 36
End: 2021-11-18

## 2021-11-18 DIAGNOSIS — Z12.11 SCREEN FOR COLON CANCER: Primary | ICD-10-CM

## 2021-11-18 RX ORDER — POLYETHYLENE GLYCOL 3350 17 G/17G
POWDER, FOR SOLUTION ORAL
Qty: 238 G | Refills: 0 | Status: SHIPPED | OUTPATIENT
Start: 2021-11-18

## 2021-11-18 RX ORDER — BISACODYL 5 MG
TABLET, DELAYED RELEASE (ENTERIC COATED) ORAL
Qty: 4 TABLET | Refills: 0 | Status: SHIPPED | OUTPATIENT
Start: 2021-11-18

## 2021-11-19 RX ORDER — BISACODYL 5 MG
TABLET, DELAYED RELEASE (ENTERIC COATED) ORAL
Qty: 4 TABLET | Refills: 0 | Status: SHIPPED | OUTPATIENT
Start: 2021-11-19

## 2021-11-19 RX ORDER — POLYETHYLENE GLYCOL 3350 17 G/17G
POWDER, FOR SOLUTION ORAL
Qty: 238 G | Refills: 0 | Status: SHIPPED | OUTPATIENT
Start: 2021-11-19

## 2022-02-14 DIAGNOSIS — K21.9 GASTROESOPHAGEAL REFLUX DISEASE, UNSPECIFIED WHETHER ESOPHAGITIS PRESENT: ICD-10-CM

## 2022-02-15 RX ORDER — LOSARTAN POTASSIUM 50 MG/1
50 TABLET ORAL DAILY
Qty: 90 TABLET | Refills: 1 | Status: SHIPPED | OUTPATIENT
Start: 2022-02-15

## 2022-02-15 RX ORDER — OMEPRAZOLE 20 MG/1
20 CAPSULE, DELAYED RELEASE ORAL DAILY
Qty: 90 CAPSULE | Refills: 1 | Status: SHIPPED | OUTPATIENT
Start: 2022-02-15

## 2022-02-15 NOTE — TELEPHONE ENCOUNTER
VM not set up; sent [facility] in [facility] to schedule  Return in about 6 months (around 2/18/2022) for HTN

## 2022-02-15 NOTE — TELEPHONE ENCOUNTER
Refill Request     Last Seen: Last Seen Department: 8/18/2021  Last Seen by PCP: 8/18/2021    Last Written: 7/12/21    Next Appointment:   No future appointments. Message to Audanika to schedule appointment.          Requested Prescriptions     Pending Prescriptions Disp Refills    omeprazole (PRILOSEC) 20 MG delayed release capsule [Pharmacy Med Name: OMEPRAZOLE 20 MG ORAL CAPSULE DELAYED RELEASE] 90 capsule 1     Sig: TAKE 1 CAPSULE BY MOUTH DAILY    losartan (COZAAR) 50 MG tablet [Pharmacy Med Name: LOSARTAN POTASSIUM 50 MG ORAL TABLET] 90 tablet      Sig: TAKE 1 TABLET BY MOUTH DAILY

## 2023-09-16 ENCOUNTER — APPOINTMENT (OUTPATIENT)
Dept: GENERAL RADIOLOGY | Age: 38
End: 2023-09-16
Payer: OTHER GOVERNMENT

## 2023-09-16 ENCOUNTER — HOSPITAL ENCOUNTER (EMERGENCY)
Age: 38
Discharge: HOME | End: 2023-09-17
Attending: STUDENT IN AN ORGANIZED HEALTH CARE EDUCATION/TRAINING PROGRAM
Payer: OTHER GOVERNMENT

## 2023-09-16 DIAGNOSIS — I48.91 ATRIAL FIBRILLATION WITH RVR (HCC): Primary | ICD-10-CM

## 2023-09-16 LAB
ALBUMIN SERPL-MCNC: 4.8 G/DL (ref 3.4–5)
ALBUMIN/GLOB SERPL: 2.3 {RATIO} (ref 1.1–2.2)
ALP SERPL-CCNC: 50 U/L (ref 40–129)
ALT SERPL-CCNC: 25 U/L (ref 10–40)
ANION GAP SERPL CALCULATED.3IONS-SCNC: 11 MMOL/L (ref 3–16)
AST SERPL-CCNC: 26 U/L (ref 15–37)
BASOPHILS # BLD: 0.1 K/UL (ref 0–0.2)
BASOPHILS NFR BLD: 0.5 %
BILIRUB SERPL-MCNC: 0.7 MG/DL (ref 0–1)
BUN SERPL-MCNC: 16 MG/DL (ref 7–20)
CALCIUM SERPL-MCNC: 9.9 MG/DL (ref 8.3–10.6)
CHLORIDE SERPL-SCNC: 101 MMOL/L (ref 99–110)
CO2 SERPL-SCNC: 27 MMOL/L (ref 21–32)
CREAT SERPL-MCNC: 1.2 MG/DL (ref 0.9–1.3)
DEPRECATED RDW RBC AUTO: 13.1 % (ref 12.4–15.4)
EOSINOPHIL # BLD: 0.1 K/UL (ref 0–0.6)
EOSINOPHIL NFR BLD: 0.8 %
GFR SERPLBLD CREATININE-BSD FMLA CKD-EPI: >60 ML/MIN/{1.73_M2}
GLUCOSE SERPL-MCNC: 91 MG/DL (ref 70–99)
HCT VFR BLD AUTO: 46.1 % (ref 40.5–52.5)
HGB BLD-MCNC: 16.3 G/DL (ref 13.5–17.5)
LYMPHOCYTES # BLD: 1.9 K/UL (ref 1–5.1)
LYMPHOCYTES NFR BLD: 18.6 %
MAGNESIUM SERPL-MCNC: 2.2 MG/DL (ref 1.8–2.4)
MCH RBC QN AUTO: 30 PG (ref 26–34)
MCHC RBC AUTO-ENTMCNC: 35.2 G/DL (ref 31–36)
MCV RBC AUTO: 85.2 FL (ref 80–100)
MONOCYTES # BLD: 1.2 K/UL (ref 0–1.3)
MONOCYTES NFR BLD: 11.8 %
NEUTROPHILS # BLD: 6.9 K/UL (ref 1.7–7.7)
NEUTROPHILS NFR BLD: 68.3 %
NT-PROBNP SERPL-MCNC: 255 PG/ML (ref 0–124)
PLATELET # BLD AUTO: 232 K/UL (ref 135–450)
PMV BLD AUTO: 8.2 FL (ref 5–10.5)
POTASSIUM SERPL-SCNC: 4.4 MMOL/L (ref 3.5–5.1)
PROT SERPL-MCNC: 6.9 G/DL (ref 6.4–8.2)
RBC # BLD AUTO: 5.41 M/UL (ref 4.2–5.9)
SODIUM SERPL-SCNC: 139 MMOL/L (ref 136–145)
TROPONIN, HIGH SENSITIVITY: 16 NG/L (ref 0–22)
TSH SERPL DL<=0.005 MIU/L-ACNC: 2.98 UIU/ML (ref 0.27–4.2)
WBC # BLD AUTO: 10.1 K/UL (ref 4–11)

## 2023-09-16 PROCEDURE — 71045 X-RAY EXAM CHEST 1 VIEW: CPT

## 2023-09-16 PROCEDURE — 93005 ELECTROCARDIOGRAM TRACING: CPT | Performed by: STUDENT IN AN ORGANIZED HEALTH CARE EDUCATION/TRAINING PROGRAM

## 2023-09-16 PROCEDURE — 83735 ASSAY OF MAGNESIUM: CPT

## 2023-09-16 PROCEDURE — 36415 COLL VENOUS BLD VENIPUNCTURE: CPT

## 2023-09-16 PROCEDURE — 99285 EMERGENCY DEPT VISIT HI MDM: CPT

## 2023-09-16 PROCEDURE — 84484 ASSAY OF TROPONIN QUANT: CPT

## 2023-09-16 PROCEDURE — 85025 COMPLETE CBC W/AUTO DIFF WBC: CPT

## 2023-09-16 PROCEDURE — 83880 ASSAY OF NATRIURETIC PEPTIDE: CPT

## 2023-09-16 PROCEDURE — 80053 COMPREHEN METABOLIC PANEL: CPT

## 2023-09-16 PROCEDURE — 84443 ASSAY THYROID STIM HORMONE: CPT

## 2023-09-16 RX ORDER — DILTIAZEM HYDROCHLORIDE 5 MG/ML
10 INJECTION INTRAVENOUS ONCE
Status: DISCONTINUED | OUTPATIENT
Start: 2023-09-16 | End: 2023-09-17

## 2023-09-16 ASSESSMENT — PAIN - FUNCTIONAL ASSESSMENT: PAIN_FUNCTIONAL_ASSESSMENT: NONE - DENIES PAIN

## 2023-09-17 VITALS
DIASTOLIC BLOOD PRESSURE: 87 MMHG | RESPIRATION RATE: 22 BRPM | HEART RATE: 87 BPM | BODY MASS INDEX: 30.8 KG/M2 | TEMPERATURE: 98.6 F | HEIGHT: 74 IN | SYSTOLIC BLOOD PRESSURE: 120 MMHG | WEIGHT: 240 LBS | OXYGEN SATURATION: 99 %

## 2023-09-17 LAB
EKG ATRIAL RATE: 441 BPM
EKG DIAGNOSIS: NORMAL
EKG Q-T INTERVAL: 294 MS
EKG QRS DURATION: 86 MS
EKG QTC CALCULATION (BAZETT): 469 MS
EKG R AXIS: 45 DEGREES
EKG T AXIS: -11 DEGREES
EKG VENTRICULAR RATE: 153 BPM
TROPONIN, HIGH SENSITIVITY: 10 NG/L (ref 0–22)

## 2023-09-17 PROCEDURE — 6370000000 HC RX 637 (ALT 250 FOR IP): Performed by: STUDENT IN AN ORGANIZED HEALTH CARE EDUCATION/TRAINING PROGRAM

## 2023-09-17 PROCEDURE — 36415 COLL VENOUS BLD VENIPUNCTURE: CPT

## 2023-09-17 PROCEDURE — 84484 ASSAY OF TROPONIN QUANT: CPT

## 2023-09-17 RX ORDER — METOPROLOL SUCCINATE 25 MG/1
25 TABLET, EXTENDED RELEASE ORAL DAILY
Qty: 30 TABLET | Refills: 3 | Status: SHIPPED | OUTPATIENT
Start: 2023-09-17

## 2023-09-17 RX ORDER — METOPROLOL SUCCINATE 25 MG/1
25 TABLET, EXTENDED RELEASE ORAL ONCE
Status: COMPLETED | OUTPATIENT
Start: 2023-09-17 | End: 2023-09-17

## 2023-09-17 RX ADMIN — APIXABAN 5 MG: 5 TABLET, FILM COATED ORAL at 01:59

## 2023-09-17 RX ADMIN — METOPROLOL SUCCINATE 25 MG: 25 TABLET, EXTENDED RELEASE ORAL at 02:00

## 2023-09-17 ASSESSMENT — LIFESTYLE VARIABLES
HOW MANY STANDARD DRINKS CONTAINING ALCOHOL DO YOU HAVE ON A TYPICAL DAY: PATIENT DOES NOT DRINK
HOW OFTEN DO YOU HAVE A DRINK CONTAINING ALCOHOL: NEVER

## 2023-09-17 NOTE — DISCHARGE INSTRUCTIONS
Follow-up with your primary doctor as well as cardiology as soon as able. Call them both on Monday to set up follow-up appointment soon as able for reevaluation. Return to emergency department for repeat episodes of palpitations, lightheadedness, dizziness, chest pain or shortness of breath or any new change or worsening symptoms were always here for evaluation never hesitate to return. I do want you to take your heart rate at least 3 times daily return if it is above 100.

## 2023-09-17 NOTE — ED NOTES
[address] Cardiology for consult     0133 Dr. Rekha Wilkerson returned phone call and spoke directly to Dr. Radhames Marroquin  09/17/23 [id number]

## 2023-09-18 ENCOUNTER — TELEPHONE (OUTPATIENT)
Dept: FAMILY MEDICINE CLINIC | Age: 38
End: 2023-09-18

## 2023-09-18 LAB
EKG ATRIAL RATE: 92 BPM
EKG DIAGNOSIS: NORMAL
EKG P AXIS: 43 DEGREES
EKG P-R INTERVAL: 144 MS
EKG Q-T INTERVAL: 346 MS
EKG QRS DURATION: 94 MS
EKG QTC CALCULATION (BAZETT): 427 MS
EKG R AXIS: 84 DEGREES
EKG T AXIS: 14 DEGREES
EKG VENTRICULAR RATE: 92 BPM

## 2023-09-18 NOTE — TELEPHONE ENCOUNTER
Pt was in the ER over the weekend and diagnosed with atrial fibrillation. Please call and see if he is still a patient here and if so, can we get him scheduled for an ER follow up.  Thank you

## 2023-09-19 NOTE — TELEPHONE ENCOUNTER
Attempted to reach the pt, the wireless customer you are calling is not available could not [facility]    Sending pt mychart message

## 2023-09-19 NOTE — ED PROVIDER NOTES
ABBIE MARTINEZ, [address] ([id number]) on 9/18/2023 7:31:54 AM      Radiographs (if obtained):  Radiologist's Report Reviewed:  XR CHEST PORTABLE    Result Date: 9/16/2023  EXAMINATION: ONE XRAY VIEW OF THE CHEST 9/16/2023 10:53 pm COMPARISON: Thoracic spine radiograph 12/11/2016 HISTORY: ORDERING SYSTEM PROVIDED HISTORY: a fib rvr TECHNOLOGIST PROVIDED HISTORY: Reason for exam:->a fib rvr Reason for Exam: a fib rvr/ palpitations FINDINGS: Clear lungs. No definite findings of pneumothorax or pleural effusion. Normal mediastinal, hilar, and cardiac contours. No acute fracture. No acute cardiopulmonary process. EKG (if obtained): (All EKG's are interpreted by myself in the absence of a cardiologist)  Atrial fibrillation with RVR, ventricular rate 153, QRS duration 86, QTc 469, no significant ST elevation or depression, nonspecific ST and T wave abnormalities. MDM:    42-year-old male presenting with history seen above. Vitals on presentation patient is tachycardic in the 150s. EKG reveals A-fib with RVR. CBC reveals no leukocytosis. Hemoglobin is within normal limits. CMP is overall reassuring and nonactionable. Magnesium is within normal limits. TSH is within normal limits. Trope as well as delta Trope are nonelevated. BNP is only mildly elevated at 255. Diltiazem bolus and drip ordered. When nurse went in to give diltiazem bolus patient converted to normal sinus rhythm. EKG repeat reveals normal sinus rhythm without significant ST elevation or depression. Patient was monitored in the ED and remained in normal sinus rhythm. I discussed admission with the patient but patient states he would really like to go home. I discussed with cardiology Dr. Cem Clark who would like patient placed on Toprol 25 mg daily as well as Eliquis 5 mg twice daily. I discussed with the patient. I discussed close follow-up with primary care physician as well as cardiology.   I discussed returning if he becomes tachycardic

## 2023-09-22 NOTE — TELEPHONE ENCOUNTER
Tried calling patient no answer and not able to leave message. Says the wireless customer is not available.

## 2024-12-27 ENCOUNTER — OFFICE VISIT (OUTPATIENT)
Dept: ENT CLINIC | Age: 39
End: 2024-12-27
Payer: COMMERCIAL

## 2024-12-27 VITALS
HEART RATE: 96 BPM | SYSTOLIC BLOOD PRESSURE: 173 MMHG | DIASTOLIC BLOOD PRESSURE: 101 MMHG | HEIGHT: 74 IN | BODY MASS INDEX: 33.55 KG/M2 | WEIGHT: 261.4 LBS

## 2024-12-27 DIAGNOSIS — R04.0 EPISTAXIS: ICD-10-CM

## 2024-12-27 DIAGNOSIS — R09.81 NASAL CONGESTION: Primary | ICD-10-CM

## 2024-12-27 DIAGNOSIS — J34.2 DEVIATED NASAL SEPTUM: ICD-10-CM

## 2024-12-27 DIAGNOSIS — J34.3 NASAL TURBINATE HYPERTROPHY: ICD-10-CM

## 2024-12-27 PROCEDURE — 99204 OFFICE O/P NEW MOD 45 MIN: CPT | Performed by: OTOLARYNGOLOGY

## 2024-12-27 RX ORDER — SILDENAFIL 100 MG/1
100 TABLET, FILM COATED ORAL
COMMUNITY
Start: 2024-10-16

## 2024-12-27 RX ORDER — AZELASTINE 1 MG/ML
1 SPRAY, METERED NASAL 2 TIMES DAILY
Qty: 60 ML | Refills: 1 | Status: SHIPPED | OUTPATIENT
Start: 2024-12-27

## 2024-12-27 RX ORDER — FLUTICASONE PROPIONATE 50 MCG
SPRAY, SUSPENSION (ML) NASAL
COMMUNITY
Start: 2024-08-05

## 2024-12-27 RX ORDER — CETIRIZINE HYDROCHLORIDE 10 MG/1
10 TABLET ORAL
COMMUNITY
Start: 2024-12-10

## 2024-12-27 ASSESSMENT — ENCOUNTER SYMPTOMS
VOICE CHANGE: 0
APNEA: 0
SINUS PRESSURE: 0
COUGH: 0
EYE ITCHING: 0
TROUBLE SWALLOWING: 0
FACIAL SWELLING: 0
SORE THROAT: 0
SHORTNESS OF BREATH: 0

## 2024-12-27 NOTE — PROGRESS NOTES
[facility]  [address], [address]  [place] [address]  P: [phone]       Patient     Alberto Mayfield  1985    ChiefComplaint     Chief Complaint   Patient presents with    New Patient    Sinus Problem     Had runny nose for years but has been getting worse lately also getting nose bleeds when blowing nose.     Ear Problem     Some ringing in the ears        History of Present Illness     Manas is a 39-year-old male here today for evaluation of bilateral nasal congestion left greater than right that has been present for years.  [facility] had improvement but that stopped working approximately 6 months ago he switched to treatment with Zyrtec without improvement.  Also reports bleeding from the nose both with nose blowing and spontaneous.    Past Medical History     Past Medical History:   Diagnosis Date    GERD (gastroesophageal reflux disease)     Headache     Hepatic steatosis 2017    Hypertension     off meds x 2 yrs    Lumbar herniated disc     Obstructive sleep apnea     Reflux        Past Surgical History     Past Surgical History:   Procedure Laterality Date    KNEE SURGERY      left torn meniscus    OTHER SURGICAL HISTORY Left 13     MICROSCOPIC LUMBAR LAMINOTOMY L4-L5 WITH EXCISION OF SYNOVIAL       Family History     Family History   Problem Relation Age of Onset    Diabetes Father     High Blood Pressure Father     Kidney Cancer Father     Heart Disease Father         stent- in his 50's    Colon Polyps Mother     Colon Cancer Maternal Grandfather 46    High Blood Pressure Paternal Grandfather        Social History     Social History     Tobacco Use    Smoking status: Former     Current packs/day: 0.00     Average packs/day: 1 pack/day for 15.0 years (15.0 ttl pk-yrs)     Types: Cigarettes     Start date: 1995     Quit date: 2007     Years since quittin.6     Passive exposure: Current    Smokeless tobacco:

## 2025-01-23 ENCOUNTER — OFFICE VISIT (OUTPATIENT)
Dept: ENT CLINIC | Age: 40
End: 2025-01-23
Payer: COMMERCIAL

## 2025-01-23 VITALS
HEART RATE: 86 BPM | HEIGHT: 74 IN | BODY MASS INDEX: 33.5 KG/M2 | SYSTOLIC BLOOD PRESSURE: 155 MMHG | DIASTOLIC BLOOD PRESSURE: 88 MMHG | WEIGHT: 261 LBS

## 2025-01-23 DIAGNOSIS — R09.81 NASAL CONGESTION: ICD-10-CM

## 2025-01-23 DIAGNOSIS — J34.2 DEVIATED NASAL SEPTUM: Primary | ICD-10-CM

## 2025-01-23 DIAGNOSIS — J34.3 NASAL TURBINATE HYPERTROPHY: ICD-10-CM

## 2025-01-23 PROCEDURE — 99214 OFFICE O/P EST MOD 30 MIN: CPT | Performed by: OTOLARYNGOLOGY

## 2025-01-23 ASSESSMENT — ENCOUNTER SYMPTOMS
VOICE CHANGE: 0
SORE THROAT: 0
TROUBLE SWALLOWING: 0
SINUS PRESSURE: 0
EYE ITCHING: 0
COUGH: 0
APNEA: 0
FACIAL SWELLING: 0
SHORTNESS OF BREATH: 0
